# Patient Record
Sex: FEMALE | Race: BLACK OR AFRICAN AMERICAN | NOT HISPANIC OR LATINO | Employment: OTHER | URBAN - METROPOLITAN AREA
[De-identification: names, ages, dates, MRNs, and addresses within clinical notes are randomized per-mention and may not be internally consistent; named-entity substitution may affect disease eponyms.]

---

## 2022-08-23 ENCOUNTER — OFFICE VISIT (OUTPATIENT)
Dept: FAMILY MEDICINE | Facility: CLINIC | Age: 57
End: 2022-08-23
Payer: COMMERCIAL

## 2022-08-23 ENCOUNTER — TELEPHONE (OUTPATIENT)
Dept: FAMILY MEDICINE | Facility: CLINIC | Age: 57
End: 2022-08-23

## 2022-08-23 VITALS
DIASTOLIC BLOOD PRESSURE: 82 MMHG | OXYGEN SATURATION: 98 % | HEIGHT: 62 IN | SYSTOLIC BLOOD PRESSURE: 130 MMHG | HEART RATE: 82 BPM | TEMPERATURE: 98 F | BODY MASS INDEX: 23.19 KG/M2 | WEIGHT: 126 LBS

## 2022-08-23 DIAGNOSIS — Z01.419 WELL WOMAN EXAM: ICD-10-CM

## 2022-08-23 DIAGNOSIS — D64.9 LOW HEMOGLOBIN: Primary | ICD-10-CM

## 2022-08-23 DIAGNOSIS — I10 BENIGN ESSENTIAL HTN: ICD-10-CM

## 2022-08-23 DIAGNOSIS — F41.8 MIXED ANXIETY DEPRESSIVE DISORDER: Primary | ICD-10-CM

## 2022-08-23 DIAGNOSIS — Z12.11 SCREENING FOR COLON CANCER: ICD-10-CM

## 2022-08-23 DIAGNOSIS — Z12.31 SCREENING MAMMOGRAM FOR BREAST CANCER: ICD-10-CM

## 2022-08-23 DIAGNOSIS — E78.00 PURE HYPERCHOLESTEROLEMIA, UNSPECIFIED: ICD-10-CM

## 2022-08-23 PROCEDURE — 3075F SYST BP GE 130 - 139MM HG: CPT | Mod: CPTII,S$GLB,, | Performed by: FAMILY MEDICINE

## 2022-08-23 PROCEDURE — 3079F DIAST BP 80-89 MM HG: CPT | Mod: CPTII,S$GLB,, | Performed by: FAMILY MEDICINE

## 2022-08-23 PROCEDURE — 3079F PR MOST RECENT DIASTOLIC BLOOD PRESSURE 80-89 MM HG: ICD-10-PCS | Mod: CPTII,S$GLB,, | Performed by: FAMILY MEDICINE

## 2022-08-23 PROCEDURE — 99204 OFFICE O/P NEW MOD 45 MIN: CPT | Mod: S$GLB,,, | Performed by: FAMILY MEDICINE

## 2022-08-23 PROCEDURE — 99204 PR OFFICE/OUTPT VISIT, NEW, LEVL IV, 45-59 MIN: ICD-10-PCS | Mod: S$GLB,,, | Performed by: FAMILY MEDICINE

## 2022-08-23 PROCEDURE — 3008F PR BODY MASS INDEX (BMI) DOCUMENTED: ICD-10-PCS | Mod: CPTII,S$GLB,, | Performed by: FAMILY MEDICINE

## 2022-08-23 PROCEDURE — 86803 HEPATITIS C AB TEST: CPT | Performed by: FAMILY MEDICINE

## 2022-08-23 PROCEDURE — 87389 HIV-1 AG W/HIV-1&-2 AB AG IA: CPT | Performed by: FAMILY MEDICINE

## 2022-08-23 PROCEDURE — 3008F BODY MASS INDEX DOCD: CPT | Mod: CPTII,S$GLB,, | Performed by: FAMILY MEDICINE

## 2022-08-23 PROCEDURE — 3075F PR MOST RECENT SYSTOLIC BLOOD PRESS GE 130-139MM HG: ICD-10-PCS | Mod: CPTII,S$GLB,, | Performed by: FAMILY MEDICINE

## 2022-08-23 RX ORDER — ATENOLOL 50 MG/1
TABLET ORAL
COMMUNITY
End: 2022-08-31

## 2022-08-23 NOTE — PROGRESS NOTES
"  Family Medicine Note  [facility]    Chief Complaint   Patient presents with    Establish Care        HPI:  56 female new patient here to establish care.  Chart review shows history of treatment for anxiety, HTN, HLD.    Blood Pressure at goal on medication,   Hyperlipidemia stable on appropriate intensity statin therapy  Anxiety stable on medication - has OCD component    However despite above, patient has been rationing/not taking medication for quite some time, as she has been without care.  Mood good off of sertraline.  Pulse somewhat elevated in absence of atenolol    Due for mammogram  Needs GYN referral  Needs GI referral for Cscope    ROS: no acute issues today    Vitals:    08/23/22 0822 08/23/22 0838   BP: 130/82    BP Location: Right arm    Patient Position: Sitting    Pulse: (!) 116 82   Temp: 97.7 °F (36.5 °C)    SpO2: 98%    Weight: 57.2 kg (126 lb)    Height: 5' 2" (1.575 m)       Body mass index is 23.05 kg/m².      General:  AOx3, well nourished and developed in no acute distress  Eyes:  PERRLA, EOMI, vision intact grossly  ENT:  normal hearing, moist oral mucosa  Neck:  trachea midline with no masses or thyromegaly  Heart:  RRR, no murmurs.  No edema noted, extremities warm and well perfused  Lungs:  clear to auscultation bilaterally with symmetric chest movement  Abdomen:  Soft, nontender, nondistended.  Normal bowel sounds  Musculoskeletal:  Normal gait.  Normal posture.  Normal muscular development with no joint swelling.  Neurological:  CN II-XII grossly intact. Symmetric strength and sensation  Psych:  Normal mood and affect.  Able to demonstrate good judgement and personal insight.      Assessment/Plan:    Mixed anxiety depressive disorder    Benign essential HTN    Pure hypercholesterolemia, unspecified    Screening mammogram for breast cancer    Screening for colon cancer    Well woman exam       1.  Seems stable off of medication, check thyroid, can resume " sertraline at patient's request  2.  At goal off of medication, get bloodwork to check organ function  3.  Check lipid profile off of statin to assess cardiac risk  4.  Get mammogram  5.  Get Cscope  6.  Send to Dr. Styles for well woman

## 2022-08-23 NOTE — TELEPHONE ENCOUNTER
Spoke with patient. Patient aware that Hemoglobin critically 5.2 and will need to redraw labs to confirm. Patient scheduled to return on 08/24/22 at 9:30 am for lab draw.

## 2022-08-23 NOTE — PATIENT INSTRUCTIONS
Get bloodwork today  Will send to Dr. Styles for pap smear/woman exam  Will send to Dr. Brooke for colonoscopy  Get mammogram    Hold off on any medication right now until we look at your bloodwork    If at any time, you feel you need to be back on medication, let me know.

## 2022-08-24 ENCOUNTER — LAB VISIT (OUTPATIENT)
Dept: LAB | Facility: CLINIC | Age: 57
End: 2022-08-24
Payer: COMMERCIAL

## 2022-08-24 ENCOUNTER — TELEPHONE (OUTPATIENT)
Dept: FAMILY MEDICINE | Facility: CLINIC | Age: 57
End: 2022-08-24
Payer: COMMERCIAL

## 2022-08-24 DIAGNOSIS — D64.9 LOW HEMOGLOBIN: ICD-10-CM

## 2022-08-24 PROBLEM — D50.9 MICROCYTIC ANEMIA: Status: ACTIVE | Noted: 2022-08-24

## 2022-08-24 LAB
ANISOCYTOSIS BLD QL SMEAR: SLIGHT
BASOPHILS # BLD AUTO: ABNORMAL K/UL (ref 0–0.2)
BASOPHILS NFR BLD: 3 % (ref 0–1.9)
DIFFERENTIAL METHOD: ABNORMAL
EOSINOPHIL # BLD AUTO: ABNORMAL K/UL (ref 0–0.5)
EOSINOPHIL NFR BLD: 0 % (ref 0–8)
ERYTHROCYTE [DISTWIDTH] IN BLOOD BY AUTOMATED COUNT: 19.8 % (ref 11.5–14.5)
HCT VFR BLD AUTO: 22.3 % (ref 37–48.5)
HGB BLD-MCNC: 5.5 G/DL (ref 12–16)
IMM GRANULOCYTES # BLD AUTO: ABNORMAL K/UL (ref 0–0.04)
IMM GRANULOCYTES NFR BLD AUTO: ABNORMAL % (ref 0–0.5)
LYMPHOCYTES # BLD AUTO: ABNORMAL K/UL (ref 1–4.8)
LYMPHOCYTES NFR BLD: 52 % (ref 18–48)
MCH RBC QN AUTO: 15.3 PG (ref 27–31)
MCHC RBC AUTO-ENTMCNC: 24.7 G/DL (ref 32–36)
MCV RBC AUTO: 62 FL (ref 82–98)
MONOCYTES # BLD AUTO: ABNORMAL K/UL (ref 0.3–1)
MONOCYTES NFR BLD: 3 % (ref 4–15)
NEUTROPHILS NFR BLD: 42 % (ref 38–73)
NRBC BLD-RTO: 0 /100 WBC
PLATELET # BLD AUTO: 507 K/UL (ref 150–450)
PMV BLD AUTO: 9.8 FL (ref 9.2–12.9)
POIKILOCYTOSIS BLD QL SMEAR: SLIGHT
POLYCHROMASIA BLD QL SMEAR: ABNORMAL
RBC # BLD AUTO: 3.6 M/UL (ref 4–5.4)
TARGETS BLD QL SMEAR: ABNORMAL
WBC # BLD AUTO: 4.92 K/UL (ref 3.9–12.7)

## 2022-08-24 PROCEDURE — 36415 COLL VENOUS BLD VENIPUNCTURE: CPT | Mod: ,,, | Performed by: STUDENT IN AN ORGANIZED HEALTH CARE EDUCATION/TRAINING PROGRAM

## 2022-08-24 PROCEDURE — 85025 COMPLETE CBC W/AUTO DIFF WBC: CPT | Performed by: FAMILY MEDICINE

## 2022-08-24 PROCEDURE — 36415 PR COLLECTION VENOUS BLOOD,VENIPUNCTURE: ICD-10-PCS | Mod: ,,, | Performed by: STUDENT IN AN ORGANIZED HEALTH CARE EDUCATION/TRAINING PROGRAM

## 2022-08-24 NOTE — TELEPHONE ENCOUNTER
Spoke with emergency contact Drea Harvey. Aware that the patient has critically low hemoglobin and may need to go to the ER for blood transfusion. Verbalized understanding and will relay message to the patient.

## 2022-08-24 NOTE — TELEPHONE ENCOUNTER
Spoke with patient. Patient aware that hemoglobin is 5.5 and that she may need to go to the ER for blood transfusion. Patient verbalized understanding.

## 2022-08-24 NOTE — TELEPHONE ENCOUNTER
Received a call from Jaci Guillermo/[facility] with a critical level hemoglobin. Level reading 5.5. Call Dr. Nixon states received that information and issued orders to the nurses at [facility] for patient notification.

## 2022-08-24 NOTE — TELEPHONE ENCOUNTER
----- Message from Anthony Nixon MD sent at 8/24/2022  2:03 PM CDT -----  Please contact the patient and let them know that their results show EXTREMELY low blood counts.  She may need to go the ED for a blood transfusion

## 2022-08-25 ENCOUNTER — OUTSIDE PLACE OF SERVICE (OUTPATIENT)
Dept: OBSTETRICS AND GYNECOLOGY | Facility: CLINIC | Age: 57
End: 2022-08-25
Payer: COMMERCIAL

## 2022-08-25 PROCEDURE — 99225 PR SUBSEQUENT OBSERVATION CARE,LEVEL II: ICD-10-PCS | Mod: ,,, | Performed by: OBSTETRICS & GYNECOLOGY

## 2022-08-25 PROCEDURE — 99225 PR SUBSEQUENT OBSERVATION CARE,LEVEL II: CPT | Mod: ,,, | Performed by: OBSTETRICS & GYNECOLOGY

## 2022-08-29 ENCOUNTER — HOSPITAL ENCOUNTER (OUTPATIENT)
Dept: RADIOLOGY | Facility: CLINIC | Age: 57
Discharge: HOME | End: 2022-08-29
Attending: FAMILY MEDICINE
Payer: COMMERCIAL

## 2022-08-29 ENCOUNTER — TELEPHONE (OUTPATIENT)
Dept: FAMILY MEDICINE | Facility: CLINIC | Age: 57
End: 2022-08-29
Payer: COMMERCIAL

## 2022-08-29 ENCOUNTER — OFFICE VISIT (OUTPATIENT)
Dept: OBSTETRICS AND GYNECOLOGY | Facility: CLINIC | Age: 57
End: 2022-08-29
Payer: COMMERCIAL

## 2022-08-29 VITALS
DIASTOLIC BLOOD PRESSURE: 82 MMHG | BODY MASS INDEX: 22.89 KG/M2 | HEIGHT: 62 IN | WEIGHT: 124.38 LBS | SYSTOLIC BLOOD PRESSURE: 154 MMHG

## 2022-08-29 DIAGNOSIS — Z12.31 SCREENING MAMMOGRAM FOR BREAST CANCER: ICD-10-CM

## 2022-08-29 DIAGNOSIS — Z01.419 ENCOUNTER FOR ANNUAL ROUTINE GYNECOLOGICAL EXAMINATION: Primary | ICD-10-CM

## 2022-08-29 DIAGNOSIS — Z12.4 SCREENING FOR CERVICAL CANCER: ICD-10-CM

## 2022-08-29 DIAGNOSIS — I10 BENIGN ESSENTIAL HTN: ICD-10-CM

## 2022-08-29 DIAGNOSIS — D25.9 UTERINE LEIOMYOMA, UNSPECIFIED LOCATION: ICD-10-CM

## 2022-08-29 PROBLEM — D50.0 IRON DEFICIENCY ANEMIA DUE TO CHRONIC BLOOD LOSS: Status: ACTIVE | Noted: 2022-08-25

## 2022-08-29 PROCEDURE — 1159F PR MEDICATION LIST DOCUMENTED IN MEDICAL RECORD: ICD-10-PCS | Mod: CPTII,S$GLB,,

## 2022-08-29 PROCEDURE — 77063 MAMMO DIGITAL SCREENING BILAT WITH TOMO: ICD-10-PCS | Mod: S$GLB,,, | Performed by: RADIOLOGY

## 2022-08-29 PROCEDURE — 99386 PR PREVENTIVE VISIT,NEW,40-64: ICD-10-PCS | Mod: S$GLB,,,

## 2022-08-29 PROCEDURE — 3079F PR MOST RECENT DIASTOLIC BLOOD PRESSURE 80-89 MM HG: ICD-10-PCS | Mod: CPTII,S$GLB,,

## 2022-08-29 PROCEDURE — 3077F PR MOST RECENT SYSTOLIC BLOOD PRESSURE >= 140 MM HG: ICD-10-PCS | Mod: CPTII,S$GLB,,

## 2022-08-29 PROCEDURE — 3008F BODY MASS INDEX DOCD: CPT | Mod: CPTII,S$GLB,,

## 2022-08-29 PROCEDURE — 77067 SCR MAMMO BI INCL CAD: CPT | Mod: S$GLB,,, | Performed by: RADIOLOGY

## 2022-08-29 PROCEDURE — 3079F DIAST BP 80-89 MM HG: CPT | Mod: CPTII,S$GLB,,

## 2022-08-29 PROCEDURE — 1159F MED LIST DOCD IN RCRD: CPT | Mod: CPTII,S$GLB,,

## 2022-08-29 PROCEDURE — 3077F SYST BP >= 140 MM HG: CPT | Mod: CPTII,S$GLB,,

## 2022-08-29 PROCEDURE — 1160F RVW MEDS BY RX/DR IN RCRD: CPT | Mod: CPTII,S$GLB,,

## 2022-08-29 PROCEDURE — 88175 CYTOPATH C/V AUTO FLUID REDO: CPT

## 2022-08-29 PROCEDURE — 1160F PR REVIEW ALL MEDS BY PRESCRIBER/CLIN PHARMACIST DOCUMENTED: ICD-10-PCS | Mod: CPTII,S$GLB,,

## 2022-08-29 PROCEDURE — 3008F PR BODY MASS INDEX (BMI) DOCUMENTED: ICD-10-PCS | Mod: CPTII,S$GLB,,

## 2022-08-29 PROCEDURE — 99386 PREV VISIT NEW AGE 40-64: CPT | Mod: S$GLB,,,

## 2022-08-29 PROCEDURE — 77063 BREAST TOMOSYNTHESIS BI: CPT | Mod: S$GLB,,, | Performed by: RADIOLOGY

## 2022-08-29 PROCEDURE — 77067 MAMMO DIGITAL SCREENING BILAT WITH TOMO: ICD-10-PCS | Mod: S$GLB,,, | Performed by: RADIOLOGY

## 2022-08-29 RX ORDER — CYANOCOBALAMIN 1000 UG/ML
1000 INJECTION, SOLUTION INTRAMUSCULAR; SUBCUTANEOUS
COMMUNITY
Start: 2022-08-31 | End: 2022-09-21

## 2022-08-29 RX ORDER — POLYETHYLENE GLYCOL 3350 17 G/17G
17 POWDER, FOR SOLUTION ORAL
COMMUNITY
Start: 2022-08-25

## 2022-08-29 RX ORDER — FERROUS SULFATE 325(65) MG
325 TABLET, DELAYED RELEASE (ENTERIC COATED) ORAL
COMMUNITY
Start: 2022-08-25 | End: 2022-08-31 | Stop reason: SDUPTHER

## 2022-08-29 NOTE — TELEPHONE ENCOUNTER
----- Message from Violet Agnulo MA sent at 8/29/2022  1:28 PM CDT -----  Regarding: iron shots  Contact: PT  Do we have the medication here to do that? I know we cannot administer outside medication, Please advise.   ----- Message -----  From: Junie Kim  Sent: 8/29/2022  11:41 AM CDT  To: [facility] THORNE Staff    Type: Needs Medical Advice    Who Called: PT  Best Call Back Number: [phone]  Additional  Information: Pt asking for a call back to schedule an appt to get the shot for being Anemic. Would like to be seen Wed 8/31/22. Was told by [facility] told her she needs to start getting the shot once a week   Please Advise- Thank you

## 2022-08-29 NOTE — TELEPHONE ENCOUNTER
----- Message from Junie Julio sent at 8/29/2022 11:30 AM CDT -----  Contact: PT  Type: Needs Medical Advice    Who Called: PT  Best Call Back Number: [phone]  Additional  Information: Pt asking for a call back to schedule an appt to get the shot for being Anemic. Would like to be seen Wed 8/31/22. Was told by [facility] told her she needs to start getting the shot once a week   Please Advise- Thank you

## 2022-08-29 NOTE — PROGRESS NOTES
"SUBJECTIVE:       Chief Complaint: Annual Exam (Referred from Dr. Nixon)    History of Present Illness: Sally Gordon is a 56 y.o. female,  who presents for annual well-woman exam by referral from PCP for excessive bleeding r/t uterine fibroids. She was seen at [facility] where TV U/S showed thickened endometrium and multiple uterine fibroids. She was given 2 units PRBCs for symptomatic anemia with a Hgb of 5.2.  Her last Pap was "years ago".  She does not have a history of abnormal Paps.  Patient does not have a history of STD/STI.  She does not desire STD/STI testing today.  She has been referred for colonoscopy.  She reports she has irregular periods and denies heavy bleeding and severe menstrual cramps.    Review of Systems   Constitutional:  Negative for activity change, appetite change, chills, diaphoresis, fatigue, fever and unexpected weight change.   HENT:  Negative for nasal congestion, dental problem, ear pain, postnasal drip, rhinorrhea, sinus pressure/congestion, sneezing and sore throat.    Eyes:  Negative for discharge, visual disturbance and eye dryness.   Respiratory:  Negative for cough, chest tightness and shortness of breath.    Cardiovascular:  Negative for chest pain, palpitations and leg swelling.   Gastrointestinal:  Negative for abdominal distention, abdominal pain, change in bowel habit, constipation, diarrhea, nausea, vomiting, reflux and change in bowel habit.   Endocrine: Negative for cold intolerance, heat intolerance, polydipsia and polyuria.   Genitourinary:  Positive for menstrual irregularity. Negative for difficulty urinating, dyspareunia, dysuria, frequency, genital sores, hot flashes, menstrual problem, pelvic pain, urgency, vaginal bleeding, vaginal discharge, vaginal pain and vaginal dryness.   Musculoskeletal:  Negative for back pain, myalgias, neck pain and neck stiffness.   Integumentary:  Negative for rash, breast mass, breast discharge and breast tenderness. " "  Allergic/Immunologic: Negative for environmental allergies and immunocompromised state.   Neurological:  Negative for dizziness, syncope, facial asymmetry, speech difficulty, weakness, light-headedness, numbness and headaches.   Hematological:  Negative for adenopathy. Does not bruise/bleed easily.   Psychiatric/Behavioral:  Negative for confusion, decreased concentration and sleep disturbance. The patient is not nervous/anxious.    Breast: Negative for mass and tenderness      OBJECTIVE:      BP (!) 154/82 (Patient Position: Sitting)   Ht 5' 2" (1.575 m)   Wt 56.4 kg (124 lb 6.4 oz)   BMI 22.75 kg/m²     Chaperone present.    Physical Exam:   Constitutional: She is oriented to person, place, and time. Vital signs are normal. She appears well-developed and well-nourished. She is cooperative. No distress.    HENT:   Head: Normocephalic and atraumatic.   Right Ear: External ear normal.   Left Ear: External ear normal.   Nose: Nose normal.   Mouth/Throat: Uvula is midline, oropharynx is clear and moist and mucous membranes are normal.    Eyes: Pupils are equal, round, and reactive to light. Conjunctivae and lids are normal.    Neck: Trachea normal. Carotid bruit is not present. No thyroid mass and no thyromegaly present.    Cardiovascular:  Normal rate, intact distal pulses and normal pulses.      Exam reveals no clubbing.      Pulse Score: 2+   Pulmonary/Chest: Effort normal and breath sounds normal. She exhibits no deformity. Right breast exhibits no inverted nipple, no mass, no nipple discharge, no skin change, no tenderness, no bleeding and no swelling. Left breast exhibits no inverted nipple, no mass, no nipple discharge, no skin change, no tenderness, no bleeding and no swelling.        Abdominal: Soft. Bowel sounds are normal. She exhibits no distension. There is no splenomegaly or hepatomegaly. There is no abdominal tenderness. No hernia.     Genitourinary:    Inguinal canal, vagina, uterus, right adnexa " and left adnexa normal.   Rectum:      No external hemorrhoid.      Pelvic exam was performed with patient supine.   The external female genitalia was normal.   Genitalia hair distrobution normal .   Labial bartholins normal.Cervix is normal. Right adnexum displays no tenderness. Left adnexum displays no tenderness. No  no vaginal discharge, tenderness or bleeding in the vagina. Vagina was moist.Vaginal atrophy noted. Cervix exhibits no motion tenderness, no lesion, no discharge and no lesion.    pap smear completedUterus is not tender and no uterine prolapse. Normal urethral meatus.Urethra findings: no urethral mass and no tendernessBladder findings: no bladder distention and no bladder tenderness          Musculoskeletal: Normal range of motion and moves all extremeties.      Right lower leg: No edema.      Left lower leg: No edema.      Lymphadenopathy:        Head (right side): No submandibular adenopathy present.        Head (left side): No submandibular adenopathy present.        Right cervical: No superficial cervical and no deep cervical adenopathy present.       Left cervical: No superficial cervical and no deep cervical adenopathy present.        Right: No supraclavicular adenopathy present.        Left: No supraclavicular adenopathy present.    Neurological: She is alert and oriented to person, place, and time. She has normal strength. GCS eye subscore is 4. GCS verbal subscore is 5. GCS motor subscore is 6.    Skin: Skin is warm and dry. Capillary refill takes less than 2 seconds. Nails show no clubbing.    Psychiatric: She has a normal mood and affect. Her speech is normal and behavior is normal. Mood, affect, judgment and thought content normal.       ASSESSMENT:       1. Encounter for annual routine gynecological examination    2. Screening for cervical cancer    3. Uterine leiomyoma, unspecified location    4. Benign essential HTN          PLAN:     Encounter for annual routine gynecological  examination    Screening for cervical cancer  -     Liquid-Based Pap Smear, Screening    Uterine leiomyoma, unspecified location  -     Ambulatory referral/consult to Gynecology    Benign essential HTN    Recommended consult with Dr. Huff for uterine fibroids and thickened endometrium. Discussed possible EMB. Recommended patient follow-up with PCP this week for elevated BP as uncontrolled HTN could limit medical and surgical treatment options. Patient agreeable to treatment plan. Patient was counseled today on [employer] Pap guidelines and recommendations for yearly pelvic exams, mammograms and monthly self breast exams; to see her PCP for other health maintenance.    Follow up in about 4 weeks (around 9/26/2022) for EMB and surgical consult with Dr. Huff.

## 2022-08-30 ENCOUNTER — TELEPHONE (OUTPATIENT)
Dept: FAMILY MEDICINE | Facility: CLINIC | Age: 57
End: 2022-08-30
Payer: COMMERCIAL

## 2022-08-30 NOTE — TELEPHONE ENCOUNTER
Call placed to patient due to message left regarding clinic administering Iron injections. Informed patient that this injection is not given a the clinic. Instructed patient to ask Dr. Nixon on scheduled appt tomorrow for direction for iron injections.    ----- Message from Violet Angulo MA sent at 8/29/2022  1:28 PM CDT -----  Regarding: iron shots  Contact: PT  Do we have the medication here to do that? I know we cannot administer outside medication, Please advise.   ----- Message -----  From: Junie Kim  Sent: 8/29/2022  11:41 AM CDT  To: [facility] THORNE Staff    Type: Needs Medical Advice    Who Called: PT  Best Call Back Number: [phone]  Additional  Information: Pt asking for a call back to schedule an appt to get the shot for being Anemic. Would like to be seen Wed 8/31/22. Was told by [facility] told her she needs to start getting the shot once a week   Please Advise- Thank you

## 2022-08-31 ENCOUNTER — OFFICE VISIT (OUTPATIENT)
Dept: FAMILY MEDICINE | Facility: CLINIC | Age: 57
End: 2022-08-31
Payer: COMMERCIAL

## 2022-08-31 VITALS
HEART RATE: 74 BPM | HEIGHT: 62 IN | DIASTOLIC BLOOD PRESSURE: 78 MMHG | TEMPERATURE: 99 F | WEIGHT: 126 LBS | SYSTOLIC BLOOD PRESSURE: 134 MMHG | OXYGEN SATURATION: 96 % | BODY MASS INDEX: 23.19 KG/M2

## 2022-08-31 DIAGNOSIS — E78.00 PURE HYPERCHOLESTEROLEMIA, UNSPECIFIED: ICD-10-CM

## 2022-08-31 DIAGNOSIS — D25.9 UTERINE LEIOMYOMA, UNSPECIFIED LOCATION: Primary | ICD-10-CM

## 2022-08-31 DIAGNOSIS — D50.0 IRON DEFICIENCY ANEMIA DUE TO CHRONIC BLOOD LOSS: ICD-10-CM

## 2022-08-31 DIAGNOSIS — D50.9 MICROCYTIC ANEMIA: ICD-10-CM

## 2022-08-31 DIAGNOSIS — F41.8 MIXED ANXIETY DEPRESSIVE DISORDER: ICD-10-CM

## 2022-08-31 PROCEDURE — 3078F PR MOST RECENT DIASTOLIC BLOOD PRESSURE < 80 MM HG: ICD-10-PCS | Mod: CPTII,S$GLB,, | Performed by: FAMILY MEDICINE

## 2022-08-31 PROCEDURE — 96372 PR INJECTION,THERAP/PROPH/DIAG2ST, IM OR SUBCUT: ICD-10-PCS | Mod: S$GLB,,, | Performed by: FAMILY MEDICINE

## 2022-08-31 PROCEDURE — 3075F PR MOST RECENT SYSTOLIC BLOOD PRESS GE 130-139MM HG: ICD-10-PCS | Mod: CPTII,S$GLB,, | Performed by: FAMILY MEDICINE

## 2022-08-31 PROCEDURE — 3008F BODY MASS INDEX DOCD: CPT | Mod: CPTII,S$GLB,, | Performed by: FAMILY MEDICINE

## 2022-08-31 PROCEDURE — 99214 PR OFFICE/OUTPT VISIT, EST, LEVL IV, 30-39 MIN: ICD-10-PCS | Mod: 25,S$GLB,, | Performed by: FAMILY MEDICINE

## 2022-08-31 PROCEDURE — 1159F PR MEDICATION LIST DOCUMENTED IN MEDICAL RECORD: ICD-10-PCS | Mod: CPTII,S$GLB,, | Performed by: FAMILY MEDICINE

## 2022-08-31 PROCEDURE — 3075F SYST BP GE 130 - 139MM HG: CPT | Mod: CPTII,S$GLB,, | Performed by: FAMILY MEDICINE

## 2022-08-31 PROCEDURE — 3078F DIAST BP <80 MM HG: CPT | Mod: CPTII,S$GLB,, | Performed by: FAMILY MEDICINE

## 2022-08-31 PROCEDURE — 96372 THER/PROPH/DIAG INJ SC/IM: CPT | Mod: S$GLB,,, | Performed by: FAMILY MEDICINE

## 2022-08-31 PROCEDURE — 3008F PR BODY MASS INDEX (BMI) DOCUMENTED: ICD-10-PCS | Mod: CPTII,S$GLB,, | Performed by: FAMILY MEDICINE

## 2022-08-31 PROCEDURE — 99214 OFFICE O/P EST MOD 30 MIN: CPT | Mod: 25,S$GLB,, | Performed by: FAMILY MEDICINE

## 2022-08-31 PROCEDURE — 1159F MED LIST DOCD IN RCRD: CPT | Mod: CPTII,S$GLB,, | Performed by: FAMILY MEDICINE

## 2022-08-31 RX ORDER — ATORVASTATIN CALCIUM 10 MG/1
10 TABLET, FILM COATED ORAL DAILY
Qty: 90 TABLET | Refills: 3
Start: 2022-08-31 | End: 2022-08-31 | Stop reason: SDUPTHER

## 2022-08-31 RX ORDER — FERROUS SULFATE 325(65) MG
325 TABLET, DELAYED RELEASE (ENTERIC COATED) ORAL EVERY OTHER DAY
Qty: 15 TABLET | Refills: 11
Start: 2022-08-31 | End: 2023-08-31

## 2022-08-31 RX ORDER — CYANOCOBALAMIN 1000 UG/ML
1000 INJECTION, SOLUTION INTRAMUSCULAR; SUBCUTANEOUS
Status: COMPLETED | OUTPATIENT
Start: 2022-08-31 | End: 2022-08-31

## 2022-08-31 RX ORDER — SERTRALINE HYDROCHLORIDE 100 MG/1
100 TABLET, FILM COATED ORAL DAILY
Qty: 90 TABLET | Refills: 3 | Status: SHIPPED | OUTPATIENT
Start: 2022-08-31 | End: 2023-08-28

## 2022-08-31 RX ORDER — ATORVASTATIN CALCIUM 10 MG/1
10 TABLET, FILM COATED ORAL DAILY
Qty: 90 TABLET | Refills: 3 | Status: SHIPPED | OUTPATIENT
Start: 2022-08-31 | End: 2023-08-28

## 2022-08-31 RX ORDER — SERTRALINE HYDROCHLORIDE 100 MG/1
100 TABLET, FILM COATED ORAL DAILY
Qty: 30 TABLET | Refills: 11
Start: 2022-08-31 | End: 2022-08-31 | Stop reason: SDUPTHER

## 2022-08-31 RX ADMIN — CYANOCOBALAMIN 1000 MCG: 1000 INJECTION, SOLUTION INTRAMUSCULAR; SUBCUTANEOUS at 08:08

## 2022-08-31 NOTE — PATIENT INSTRUCTIONS
Doctors:  Dr Nixon - primary doctor - doing well today  Dr. Shah - female doctor  Dr. Brooke - doctor for colonoscopy - see him on October 17    Medicine:  Take iron pill every other day - this helps body make more blood  Take B12 shot today - you don't need this routinely  Take cholesterol medicine daily - lipitor  Take anxiety medication daily - zoloft  Powder - only as needed for constipation    Follow up 3 months

## 2022-08-31 NOTE — PROGRESS NOTES
"  Family Medicine Note  [facility]    Chief Complaint   Patient presents with    Follow-up        HPI:  56 female here for follow up from GYN - BP was elevated there, but at goal here today.  Now up to date on mammogram and pap    Recent bloodwork showed profound anemia - patient was instructed to go to ED and received 2 units PRBCs.  TVUS showed uterine fibroids and thick endometrium - apparently heavy uterine bleeding was problem for quite some time.    GI referral pending.    Is on iron supplement now.  Needs B12 shot today.  Hyperlipidemia stable on appropriate intensity statin therapy  Anxiety stable on medication      ROS: energy level improving    Vitals:    08/31/22 0813   BP: 134/78   BP Location: Left arm   Patient Position: Sitting   BP Method: Medium (Manual)   Pulse: 74   Temp: 98.6 °F (37 °C)   TempSrc: Temporal   SpO2: 96%   Weight: 57.2 kg (126 lb)   Height: 5' 2" (1.575 m)      Body mass index is 23.05 kg/m².      General:  AOx3, well nourished and developed in no acute distress  Eyes:  PERRLA, EOMI, vision intact grossly  ENT:  normal hearing, moist oral mucosa  Neck:  trachea midline with no masses or thyromegaly  Heart:  RRR, no murmurs.  No edema noted, extremities warm and well perfused  Lungs:  clear to auscultation bilaterally with symmetric chest movement  Abdomen:  Soft, nontender, nondistended.  Normal bowel sounds  Musculoskeletal:  Normal gait.  Normal posture.  Normal muscular development with no joint swelling.  Neurological:  CN II-XII grossly intact. Symmetric strength and sensation  Psych:  Normal mood and affect.  Able to demonstrate good judgement and personal insight.      Assessment/Plan:    Uterine leiomyoma, unspecified location    Microcytic anemia    Iron deficiency anemia due to chronic blood loss    Pure hypercholesterolemia, unspecified    Mixed anxiety depressive disorder    Other orders  -     ferrous sulfate 325 (65 FE) MG EC tablet; Take 1 " tablet (325 mg total) by mouth every other day.  Dispense: 15 tablet; Refill: 11  -     Discontinue: atorvastatin (LIPITOR) 10 MG tablet; Take 1 tablet (10 mg total) by mouth once daily.  Dispense: 90 tablet; Refill: 3  -     Discontinue: sertraline (ZOLOFT) 100 MG tablet; Take 1 tablet (100 mg total) by mouth once daily.  Dispense: 30 tablet; Refill: 11  -     atorvastatin (LIPITOR) 10 MG tablet; Take 1 tablet (10 mg total) by mouth once daily.  Dispense: 90 tablet; Refill: 3  -     sertraline (ZOLOFT) 100 MG tablet; Take 1 tablet (100 mg total) by mouth once daily.  Dispense: 90 tablet; Refill: 3        Stable with GYN follow up now  Stable, GI pending.  Starting iron supplementation today  Stable on statin  Stable on zoloft

## 2022-09-06 LAB
FINAL PATHOLOGIC DIAGNOSIS: NORMAL
Lab: NORMAL

## 2022-09-21 ENCOUNTER — TELEPHONE (OUTPATIENT)
Dept: FAMILY MEDICINE | Facility: CLINIC | Age: 57
End: 2022-09-21
Payer: COMMERCIAL

## 2022-09-21 NOTE — TELEPHONE ENCOUNTER
Call placed to patient due to message left. Patient not available at this time message left on machine for return call.    ----- Message from Emily Baker sent at 9/21/2022  2:28 PM CDT -----  Contact: pt  Type: Needs Medical Advice         Who Called: pt  Best Call Back Number:[phone]  Additional Information: Requesting a call back regarding pt is wanting to know if she sould keep her appt with Dr joiner on 09/29 or just go to Dr Garcia on 10/07.  Pt is asking for office to call her back.   Please Advise- Thank you

## 2022-09-28 ENCOUNTER — TELEPHONE (OUTPATIENT)
Dept: OBSTETRICS AND GYNECOLOGY | Facility: CLINIC | Age: 57
End: 2022-09-28
Payer: COMMERCIAL

## 2022-09-28 ENCOUNTER — TELEPHONE (OUTPATIENT)
Dept: OBSTETRICS AND GYNECOLOGY | Facility: CLINIC | Age: 57
End: 2022-09-28

## 2022-09-28 NOTE — TELEPHONE ENCOUNTER
----- Message from Junie Kim sent at 9/28/2022 12:06 PM CDT -----  Contact: PT  Type:  Patient Returning Call    Who Called:  PT  Who Left Message for Patient:  don't know  Does the patient know what this is regarding?:  No  Best Call Back Number:  [phone]  Additional Information: Pt had a missed a call no vmail left

## 2022-09-28 NOTE — TELEPHONE ENCOUNTER
Spoke with pt via telephone regarding pap results from 8/29/22 done by A Candy RIOS.  Pt has appt tomorrow for EMB and surgical consult and will address urinary incontinence.  Pt v/u

## 2022-09-29 ENCOUNTER — PROCEDURE VISIT (OUTPATIENT)
Dept: OBSTETRICS AND GYNECOLOGY | Facility: CLINIC | Age: 57
End: 2022-09-29
Payer: COMMERCIAL

## 2022-09-29 VITALS
DIASTOLIC BLOOD PRESSURE: 82 MMHG | BODY MASS INDEX: 23 KG/M2 | WEIGHT: 125 LBS | HEIGHT: 62 IN | SYSTOLIC BLOOD PRESSURE: 124 MMHG

## 2022-09-29 DIAGNOSIS — N93.9 ABNORMAL UTERINE BLEEDING: Primary | ICD-10-CM

## 2022-09-29 DIAGNOSIS — D25.9 UTERINE LEIOMYOMA, UNSPECIFIED LOCATION: ICD-10-CM

## 2022-09-29 PROCEDURE — 99499 UNLISTED E&M SERVICE: CPT | Mod: S$GLB,,, | Performed by: OBSTETRICS & GYNECOLOGY

## 2022-09-29 PROCEDURE — 99499 NO LOS: ICD-10-PCS | Mod: S$GLB,,, | Performed by: OBSTETRICS & GYNECOLOGY

## 2022-10-06 ENCOUNTER — TELEPHONE (OUTPATIENT)
Dept: OBSTETRICS AND GYNECOLOGY | Facility: CLINIC | Age: 57
End: 2022-10-06
Payer: COMMERCIAL

## 2022-10-18 NOTE — PROCEDURES
Procedures  Patient with abnormal uterine bleeding.  Recommend endometrial biopsy.  Patient consented.  Risks/benefits were discussed.  Patient was placed in the supine position.  Sterile speculum was placed.  Cervix prepped with Betadine.  A single-tooth tenaculum was used on the anterior lip of the cervix.  Multiple attempts were made with the uterine pipelle and cervical os Finders/dilator to pass into the endometrium however unable to pass through the internal cervical os.  Possibly due to uterine fibroids.  Discussed need for evaluation endometrial cavity.  Recommend hysteroscopy with dilation curettage.  Will schedule have patient return for preoperative visit.  Plan discussed with patient she expressed understanding.

## 2022-10-21 ENCOUNTER — TELEPHONE (OUTPATIENT)
Dept: FAMILY MEDICINE | Facility: CLINIC | Age: 57
End: 2022-10-21
Payer: COMMERCIAL

## 2022-10-21 NOTE — TELEPHONE ENCOUNTER
----- Message from Anthony Nixon MD sent at 10/21/2022 12:49 PM CDT -----  Regarding: RE: Iron tablets  Contact: PT  Absolutely.  This is perfectly fine  ----- Message -----  From: Keyon Lenz LPN  Sent: 10/20/2022   4:20 PM CDT  To: Anthony Nixon MD  Subject: Iron tablets                                     MRN:  [id number]  See below. Is ok for patient to stop iron tablets x 7days for colonoscopy?  ----- Message -----  From: Junie Julio  Sent: 10/20/2022  11:52 AM CDT  To: [facility] THORNE Staff    Type: Needs Medical Advice    Who Called: PT  Best Call Back Number: [phone]  Additional  Information: Pt asking for a call back to find out if it's ok for her to stop asking her iron pills for 7 days. Having a Colonoscopy done on 10/31/22  Please Advise- Thank you

## 2022-10-26 ENCOUNTER — TELEPHONE (OUTPATIENT)
Dept: OBSTETRICS AND GYNECOLOGY | Facility: CLINIC | Age: 57
End: 2022-10-26

## 2022-10-31 LAB — CRC RECOMMENDATION EXT: NORMAL

## 2022-11-02 ENCOUNTER — OFFICE VISIT (OUTPATIENT)
Dept: OBSTETRICS AND GYNECOLOGY | Facility: CLINIC | Age: 57
End: 2022-11-02
Payer: COMMERCIAL

## 2022-11-02 VITALS
HEIGHT: 62 IN | BODY MASS INDEX: 23.74 KG/M2 | SYSTOLIC BLOOD PRESSURE: 128 MMHG | WEIGHT: 129 LBS | DIASTOLIC BLOOD PRESSURE: 76 MMHG

## 2022-11-02 DIAGNOSIS — N93.9 ABNORMAL UTERINE BLEEDING: Primary | ICD-10-CM

## 2022-11-02 DIAGNOSIS — D25.9 UTERINE LEIOMYOMA, UNSPECIFIED LOCATION: ICD-10-CM

## 2022-11-02 PROCEDURE — 99213 PR OFFICE/OUTPT VISIT, EST, LEVL III, 20-29 MIN: ICD-10-PCS | Mod: S$GLB,,, | Performed by: OBSTETRICS & GYNECOLOGY

## 2022-11-02 PROCEDURE — 1159F MED LIST DOCD IN RCRD: CPT | Mod: CPTII,S$GLB,, | Performed by: OBSTETRICS & GYNECOLOGY

## 2022-11-02 PROCEDURE — 3078F PR MOST RECENT DIASTOLIC BLOOD PRESSURE < 80 MM HG: ICD-10-PCS | Mod: CPTII,S$GLB,, | Performed by: OBSTETRICS & GYNECOLOGY

## 2022-11-02 PROCEDURE — 3008F BODY MASS INDEX DOCD: CPT | Mod: CPTII,S$GLB,, | Performed by: OBSTETRICS & GYNECOLOGY

## 2022-11-02 PROCEDURE — 99213 OFFICE O/P EST LOW 20 MIN: CPT | Mod: S$GLB,,, | Performed by: OBSTETRICS & GYNECOLOGY

## 2022-11-02 PROCEDURE — 3074F SYST BP LT 130 MM HG: CPT | Mod: CPTII,S$GLB,, | Performed by: OBSTETRICS & GYNECOLOGY

## 2022-11-02 PROCEDURE — 3078F DIAST BP <80 MM HG: CPT | Mod: CPTII,S$GLB,, | Performed by: OBSTETRICS & GYNECOLOGY

## 2022-11-02 PROCEDURE — 3074F PR MOST RECENT SYSTOLIC BLOOD PRESSURE < 130 MM HG: ICD-10-PCS | Mod: CPTII,S$GLB,, | Performed by: OBSTETRICS & GYNECOLOGY

## 2022-11-02 PROCEDURE — 3008F PR BODY MASS INDEX (BMI) DOCUMENTED: ICD-10-PCS | Mod: CPTII,S$GLB,, | Performed by: OBSTETRICS & GYNECOLOGY

## 2022-11-02 PROCEDURE — 1159F PR MEDICATION LIST DOCUMENTED IN MEDICAL RECORD: ICD-10-PCS | Mod: CPTII,S$GLB,, | Performed by: OBSTETRICS & GYNECOLOGY

## 2022-11-02 NOTE — PROGRESS NOTES
Patient ID: Sally Gordon is a 56 y.o. female.    Chief Complaint:  Pre-op Exam (Pt is here for a Pre Op exam [facility]** HSC,D&C w/ Bx on 22 visit.)      History of Present Illness  HPI  Patient here for preoperative visit for hysteroscopy with dilation and curettage and endometrial biopsy.  Patient with postmenopausal vaginal bleeding.  Endometrial biopsy was attempted in the office however due to cervical stenosis the Pipelle was unable to be passed through the cervix.  Patient does have multiple uterine fibroids and this may have been the cause of her inability to pass the Pipelle through the cervix.  She reports some pelvic pressure but mostly abnormal uterine bleeding.  She states is irregular.  Her last bleed was several months ago.  Ultrasound does show an enlarged uterus with multiple uterine leiomyoma.  Ultrasound did show a slightly thickened endometrium.  Options were discussed and she elected for hysteroscopy with dilation curettage in the operating room.  She states she has no other complaints or new symptoms.  Doing well otherwise.    GYN & OB History  No LMP recorded. Patient is perimenopausal.   Date of Last Pap: 2022    Past Medical History:   Diagnosis Date    Depression      History reviewed. No pertinent surgical history.  Review of patient's allergies indicates:  No Known Allergies  Current Outpatient Medications on File Prior to Visit   Medication Sig Dispense Refill    atorvastatin (LIPITOR) 10 MG tablet Take 1 tablet (10 mg total) by mouth once daily. 90 tablet 3    ferrous sulfate 325 (65 FE) MG EC tablet Take 1 tablet (325 mg total) by mouth every other day. 15 tablet 11    polyethylene glycol (GLYCOLAX) 17 gram/dose powder Take 17 g by mouth.      sertraline (ZOLOFT) 100 MG tablet Take 1 tablet (100 mg total) by mouth once daily. 90 tablet 3     No current facility-administered medications on file prior to visit.       OB History    Para Term  AB Living   0 0 0        "  SAB IAB Ectopic Multiple Live Births                   Review of Systems  Review of Systems   Constitutional:  Negative for fatigue, fever and unexpected weight change.   HENT:  Negative for nasal congestion.    Respiratory:  Negative for cough and shortness of breath.    Cardiovascular:  Negative for chest pain, palpitations and leg swelling.   Gastrointestinal:  Negative for abdominal pain, constipation, diarrhea, nausea, vomiting and reflux.   Endocrine: Negative for diabetes, hair loss and hot flashes.   Genitourinary:  Positive for menorrhagia and postmenopausal bleeding. Negative for bladder incontinence, decreased libido, dysmenorrhea, dyspareunia, dysuria, hot flashes, pelvic pain, vaginal discharge and vaginal dryness.   Musculoskeletal:  Negative for arthralgias, back pain and myalgias.   Integumentary:  Negative for rash, acne, hair changes, mole/lesion, breast mass, nipple discharge, breast skin changes and breast tenderness.   Neurological:  Negative for seizures, syncope and headaches.   Hematological:  Negative for adenopathy. Does not bruise/bleed easily.   Psychiatric/Behavioral:  Negative for depression and sleep disturbance. The patient is not nervous/anxious.    Breast: Negative for breast self exam, lump, mass, mastodynia, nipple discharge, skin changes and tenderness            Objective:   /76   Ht 5' 2" (1.575 m)   Wt 58.5 kg (129 lb)   BMI 23.59 kg/m²        Physical Exam:   Constitutional: She is oriented to person, place, and time. She appears well-developed and well-nourished. No distress.    HENT:   Head: Normocephalic and atraumatic.   Nose: Nose normal.            Abdominal: Soft. She exhibits no distension and no mass. There is no abdominal tenderness.             Musculoskeletal: Normal range of motion and moves all extremeties. No edema.       Neurological: She is alert and oriented to person, place, and time. No cranial nerve deficit.     Psychiatric: She has a normal " mood and affect. Her behavior is normal. Judgment and thought content normal.          Assessment:      1. Abnormal uterine bleeding    2. Uterine leiomyoma, unspecified location          Plan:      Patient consented for hysteroscopy with dilation curettage and endometrial biopsy.  Risks/benefits/alternatives of procedure were discussed at length including but not limited to the risk of bleeding, infection, damage surrounding tissue.  Patient expressed understanding risks elects to proceed.  All questions answered.  Proceed with surgery.

## 2022-11-09 ENCOUNTER — OUTSIDE PLACE OF SERVICE (OUTPATIENT)
Dept: OBSTETRICS AND GYNECOLOGY | Facility: CLINIC | Age: 57
End: 2022-11-09
Payer: COMMERCIAL

## 2022-11-09 PROCEDURE — 58558 PR HYSTEROSCOPY,W/ENDO BX: ICD-10-PCS | Mod: ,,, | Performed by: OBSTETRICS & GYNECOLOGY

## 2022-11-09 PROCEDURE — 58558 HYSTEROSCOPY BIOPSY: CPT | Mod: ,,, | Performed by: OBSTETRICS & GYNECOLOGY

## 2022-11-29 ENCOUNTER — OFFICE VISIT (OUTPATIENT)
Dept: OBSTETRICS AND GYNECOLOGY | Facility: CLINIC | Age: 57
End: 2022-11-29
Payer: COMMERCIAL

## 2022-11-29 VITALS
DIASTOLIC BLOOD PRESSURE: 66 MMHG | HEIGHT: 62 IN | SYSTOLIC BLOOD PRESSURE: 120 MMHG | BODY MASS INDEX: 23.92 KG/M2 | WEIGHT: 130 LBS

## 2022-11-29 DIAGNOSIS — Z09 EXAMINATION FOLLOWING SURGERY: Primary | ICD-10-CM

## 2022-11-29 DIAGNOSIS — N93.9 ABNORMAL UTERINE BLEEDING: ICD-10-CM

## 2022-11-29 PROCEDURE — 3078F PR MOST RECENT DIASTOLIC BLOOD PRESSURE < 80 MM HG: ICD-10-PCS | Mod: CPTII,S$GLB,, | Performed by: OBSTETRICS & GYNECOLOGY

## 2022-11-29 PROCEDURE — 3078F DIAST BP <80 MM HG: CPT | Mod: CPTII,S$GLB,, | Performed by: OBSTETRICS & GYNECOLOGY

## 2022-11-29 PROCEDURE — 3008F PR BODY MASS INDEX (BMI) DOCUMENTED: ICD-10-PCS | Mod: CPTII,S$GLB,, | Performed by: OBSTETRICS & GYNECOLOGY

## 2022-11-29 PROCEDURE — 99024 PR POST-OP FOLLOW-UP VISIT: ICD-10-PCS | Mod: S$GLB,,, | Performed by: OBSTETRICS & GYNECOLOGY

## 2022-11-29 PROCEDURE — 3074F SYST BP LT 130 MM HG: CPT | Mod: CPTII,S$GLB,, | Performed by: OBSTETRICS & GYNECOLOGY

## 2022-11-29 PROCEDURE — 1159F PR MEDICATION LIST DOCUMENTED IN MEDICAL RECORD: ICD-10-PCS | Mod: CPTII,S$GLB,, | Performed by: OBSTETRICS & GYNECOLOGY

## 2022-11-29 PROCEDURE — 3008F BODY MASS INDEX DOCD: CPT | Mod: CPTII,S$GLB,, | Performed by: OBSTETRICS & GYNECOLOGY

## 2022-11-29 PROCEDURE — 1159F MED LIST DOCD IN RCRD: CPT | Mod: CPTII,S$GLB,, | Performed by: OBSTETRICS & GYNECOLOGY

## 2022-11-29 PROCEDURE — 3074F PR MOST RECENT SYSTOLIC BLOOD PRESSURE < 130 MM HG: ICD-10-PCS | Mod: CPTII,S$GLB,, | Performed by: OBSTETRICS & GYNECOLOGY

## 2022-11-29 PROCEDURE — 99024 POSTOP FOLLOW-UP VISIT: CPT | Mod: S$GLB,,, | Performed by: OBSTETRICS & GYNECOLOGY

## 2022-11-29 NOTE — PROGRESS NOTES
"Chief Complaint   Patient presents with    Post-op Evaluation     Pt is here for a Post op Evaluation SRG**HSC, D&C w/ BX on 11/9/22         History of Present Illness:   Pateint presents today  3 weeks postop, status post HSC, D&C w/ Bx, with complaint of no pain. The patient is not having any pain.  She had some light spotting after the procedure however that has now resolved.  She does also report a little bit of mild stress urinary incontinence when she coughs sneezes runs jumps but is not to the point where she needs to use a panty liner.    Pathology: Discussion of test results with performing physician    Past medical and surgical history reviewed.   I have reviewed the patient's medical history in detail and updated the computerized patient record.    Physical exam:  /66   Ht 5' 2" (1.575 m)   Wt 59 kg (130 lb)   BMI 23.78 kg/m²   General:  Well-developed, well-nourished female in no acute distress  HEENT:  Normocephalic, atraumatic, extraocular muscles intact, moist mucous membranes  Breasts: deferred  Abdominal:  Soft, nontender, nondistended, incision clean dry and intact.   Extremities:  Warm, dry, no clubbing/cyanosis/edema  Neurological:  For cranial nerves 2-12 grossly intact   Dermatologic:  No rashes/lesions/bruising  Psychiatric:  Appropriate mood, affect, speech    Assessment:  Status post  hysteroscopy with dilation curettage and removal of endometrial polyp    Plan:  Doing well.  Benign endometrium and polyp noted.  Routine postop care.  Cleared from a surgery standpoint.  Elects for observation at this time.  Return in 6 months.      "

## 2022-11-30 ENCOUNTER — OFFICE VISIT (OUTPATIENT)
Dept: FAMILY MEDICINE | Facility: CLINIC | Age: 57
End: 2022-11-30
Payer: COMMERCIAL

## 2022-11-30 VITALS
DIASTOLIC BLOOD PRESSURE: 80 MMHG | WEIGHT: 131 LBS | OXYGEN SATURATION: 97 % | HEART RATE: 87 BPM | TEMPERATURE: 98 F | SYSTOLIC BLOOD PRESSURE: 138 MMHG | BODY MASS INDEX: 24.11 KG/M2 | HEIGHT: 62 IN

## 2022-11-30 DIAGNOSIS — F41.8 MIXED ANXIETY DEPRESSIVE DISORDER: ICD-10-CM

## 2022-11-30 DIAGNOSIS — D50.0 IRON DEFICIENCY ANEMIA DUE TO CHRONIC BLOOD LOSS: Primary | ICD-10-CM

## 2022-11-30 DIAGNOSIS — E78.00 PURE HYPERCHOLESTEROLEMIA, UNSPECIFIED: ICD-10-CM

## 2022-11-30 PROCEDURE — 3075F PR MOST RECENT SYSTOLIC BLOOD PRESS GE 130-139MM HG: ICD-10-PCS | Mod: CPTII,S$GLB,, | Performed by: FAMILY MEDICINE

## 2022-11-30 PROCEDURE — 99214 OFFICE O/P EST MOD 30 MIN: CPT | Mod: S$GLB,,, | Performed by: FAMILY MEDICINE

## 2022-11-30 PROCEDURE — 99214 PR OFFICE/OUTPT VISIT, EST, LEVL IV, 30-39 MIN: ICD-10-PCS | Mod: S$GLB,,, | Performed by: FAMILY MEDICINE

## 2022-11-30 PROCEDURE — 1159F MED LIST DOCD IN RCRD: CPT | Mod: CPTII,S$GLB,, | Performed by: FAMILY MEDICINE

## 2022-11-30 PROCEDURE — 3008F BODY MASS INDEX DOCD: CPT | Mod: CPTII,S$GLB,, | Performed by: FAMILY MEDICINE

## 2022-11-30 PROCEDURE — 3079F DIAST BP 80-89 MM HG: CPT | Mod: CPTII,S$GLB,, | Performed by: FAMILY MEDICINE

## 2022-11-30 PROCEDURE — 1159F PR MEDICATION LIST DOCUMENTED IN MEDICAL RECORD: ICD-10-PCS | Mod: CPTII,S$GLB,, | Performed by: FAMILY MEDICINE

## 2022-11-30 PROCEDURE — 3079F PR MOST RECENT DIASTOLIC BLOOD PRESSURE 80-89 MM HG: ICD-10-PCS | Mod: CPTII,S$GLB,, | Performed by: FAMILY MEDICINE

## 2022-11-30 PROCEDURE — 3075F SYST BP GE 130 - 139MM HG: CPT | Mod: CPTII,S$GLB,, | Performed by: FAMILY MEDICINE

## 2022-11-30 PROCEDURE — 3008F PR BODY MASS INDEX (BMI) DOCUMENTED: ICD-10-PCS | Mod: CPTII,S$GLB,, | Performed by: FAMILY MEDICINE

## 2022-11-30 NOTE — PATIENT INSTRUCTIONS
Doing well  Blood counts all look good  Start back on iron after procedure    Follow up 6 months    Sent shingles vaccine to [facility]

## 2022-11-30 NOTE — PROGRESS NOTES
"  Family Medicine Note  [facility]    Chief Complaint   Patient presents with    Follow-up     3 month Fu. NO complaints.        HPI:  Last visit found to be profoundly anemia - has since had workup with both GI and  - GYN - has capsule endoscopy upcoming, and underwent hysteroscopy with D&C.  Reports feeling good overall.    H/H markedly better    /66 just yesterday    Anxiety stable  Hyperlipidemia stable on appropriate intensity statin therapy      ROS: as above    Vitals:    11/30/22 0843 11/30/22 0844 11/30/22 0902   BP: (!) 160/74 (!) 152/80 138/80   Pulse: 87     Temp: 98.2 °F (36.8 °C)     SpO2: 97%     Weight: 59.4 kg (131 lb)     Height: 5' 2" (1.575 m)        Body mass index is 23.96 kg/m².      General:  AOx3, well nourished and developed in no acute distress  Eyes:  PERRLA, EOMI, vision intact grossly  ENT:  normal hearing, moist oral mucosa  Neck:  trachea midline with no masses or thyromegaly  Heart:  RRR, no murmurs.  No edema noted, extremities warm and well perfused  Lungs:  clear to auscultation bilaterally with symmetric chest movement  Abdomen:  Soft, nontender, nondistended.  Normal bowel sounds  Musculoskeletal:  Normal gait.  Normal posture.  Normal muscular development with no joint swelling.  Neurological:  CN II-XII grossly intact. Symmetric strength and sensation  Psych:  Normal mood and affect.  Able to demonstrate good judgement and personal insight.      Assessment/Plan:    Iron deficiency anemia due to chronic blood loss    Pure hypercholesterolemia, unspecified    Mixed anxiety depressive disorder        Resume iron therapy after endoscopy done  Stable on statin  Stable on medication    "

## 2022-12-28 ENCOUNTER — PATIENT MESSAGE (OUTPATIENT)
Dept: OBSTETRICS AND GYNECOLOGY | Facility: CLINIC | Age: 57
End: 2022-12-28
Payer: COMMERCIAL

## 2022-12-28 ENCOUNTER — TELEPHONE (OUTPATIENT)
Dept: OBSTETRICS AND GYNECOLOGY | Facility: CLINIC | Age: 57
End: 2022-12-28
Payer: COMMERCIAL

## 2022-12-28 DIAGNOSIS — Z12.31 SCREENING MAMMOGRAM, ENCOUNTER FOR: Primary | ICD-10-CM

## 2022-12-28 NOTE — TELEPHONE ENCOUNTER
Called Pt, NA. LVM stating Mammogram was normal.        ----- Message from Raheem Newby MA sent at 12/27/2022  5:37 PM CST -----  Contact: PT    ----- Message -----  From: Raheem Newby MA  Sent: 12/23/2022   4:30 PM CST  To: Raheem Newby MA      ----- Message -----  From: Milagros Gupta NP  Sent: 12/23/2022  11:35 AM CST  To: Raheem Newby MA    Please notify patient her mammogram was normal. Thank you.  ----- Message -----  From: Maureen Truong  Sent: 12/23/2022  10:06 AM CST  To: [facility] Staff    Type:  Test Results    Who Called:  PT  Name of Test (Lab/Mammo/Etc):  LABS AND ANNUAL  Date of Test:  8/29  Ordering Provider:  CUAUHTEMOC  Where the test was performed:  [facility]  Best Call Back Number:  [phone]  Additional Information:  PLEASE CALL, IT IS NOT SHOWING IN HER CHART RESULTS.

## 2022-12-28 NOTE — TELEPHONE ENCOUNTER
----- Message from Raheem Newby MA sent at 12/23/2022  4:30 PM CST -----  Contact: PT    ----- Message -----  From: Milagros Gupta NP  Sent: 12/23/2022  11:35 AM CST  To: Raheem Newby MA    Please notify patient her mammogram was normal. Thank you.  ----- Message -----  From: Maureen Truong  Sent: 12/23/2022  10:06 AM CST  To: [facility] Staff    Type:  Test Results    Who Called:  PT  Name of Test (Lab/Mammo/Etc):  LABS AND ANNUAL  Date of Test:  8/29  Ordering Provider:  CUAUHTEMOC  Where the test was performed:  [facility]  Best Call Back Number:  [phone]  Additional Information:  PLEASE CALL, IT IS NOT SHOWING IN HER CHART RESULTS.

## 2023-04-19 ENCOUNTER — TELEPHONE (OUTPATIENT)
Dept: FAMILY MEDICINE | Facility: CLINIC | Age: 58
End: 2023-04-19
Payer: COMMERCIAL

## 2023-04-19 NOTE — TELEPHONE ENCOUNTER
I contacted the patient to reschedule her her appointment for 05/29/2023, no answer I left her voicemail.

## 2023-06-05 ENCOUNTER — OFFICE VISIT (OUTPATIENT)
Dept: FAMILY MEDICINE | Facility: CLINIC | Age: 58
End: 2023-06-05
Payer: COMMERCIAL

## 2023-06-05 VITALS
HEART RATE: 88 BPM | SYSTOLIC BLOOD PRESSURE: 108 MMHG | BODY MASS INDEX: 18.28 KG/M2 | TEMPERATURE: 98 F | DIASTOLIC BLOOD PRESSURE: 70 MMHG | OXYGEN SATURATION: 98 % | HEIGHT: 72 IN | WEIGHT: 135 LBS

## 2023-06-05 DIAGNOSIS — D50.0 IRON DEFICIENCY ANEMIA DUE TO CHRONIC BLOOD LOSS: ICD-10-CM

## 2023-06-05 DIAGNOSIS — E78.00 PURE HYPERCHOLESTEROLEMIA, UNSPECIFIED: ICD-10-CM

## 2023-06-05 DIAGNOSIS — F41.8 MIXED ANXIETY DEPRESSIVE DISORDER: Primary | ICD-10-CM

## 2023-06-05 DIAGNOSIS — L20.82 FLEXURAL ECZEMA: ICD-10-CM

## 2023-06-05 DIAGNOSIS — Z00.00 ANNUAL PHYSICAL EXAM: ICD-10-CM

## 2023-06-05 LAB
ALBUMIN SERPL BCP-MCNC: 4.1 G/DL (ref 3.5–5.2)
ALP SERPL-CCNC: 81 U/L (ref 55–135)
ALT SERPL W/O P-5'-P-CCNC: 21 U/L (ref 10–44)
ANION GAP SERPL CALC-SCNC: 9 MMOL/L (ref 8–16)
AST SERPL-CCNC: 23 U/L (ref 10–40)
BASOPHILS # BLD AUTO: 0.05 K/UL (ref 0–0.2)
BASOPHILS NFR BLD: 1.2 % (ref 0–1.9)
BILIRUB SERPL-MCNC: 0.4 MG/DL (ref 0.1–1)
BUN SERPL-MCNC: 17 MG/DL (ref 6–20)
CALCIUM SERPL-MCNC: 9.7 MG/DL (ref 8.7–10.5)
CHLORIDE SERPL-SCNC: 105 MMOL/L (ref 95–110)
CHOLEST SERPL-MCNC: 172 MG/DL (ref 120–199)
CHOLEST/HDLC SERPL: 2.5 {RATIO} (ref 2–5)
CO2 SERPL-SCNC: 28 MMOL/L (ref 23–29)
CREAT SERPL-MCNC: 0.8 MG/DL (ref 0.5–1.4)
DIFFERENTIAL METHOD: NORMAL
EOSINOPHIL # BLD AUTO: 0 K/UL (ref 0–0.5)
EOSINOPHIL NFR BLD: 1 % (ref 0–8)
ERYTHROCYTE [DISTWIDTH] IN BLOOD BY AUTOMATED COUNT: 12.1 % (ref 11.5–14.5)
EST. GFR  (NO RACE VARIABLE): >60 ML/MIN/1.73 M^2
GLUCOSE SERPL-MCNC: 93 MG/DL (ref 70–110)
HCT VFR BLD AUTO: 40.9 % (ref 37–48.5)
HDLC SERPL-MCNC: 69 MG/DL (ref 40–75)
HDLC SERPL: 40.1 % (ref 20–50)
HGB BLD-MCNC: 13.7 G/DL (ref 12–16)
IMM GRANULOCYTES # BLD AUTO: 0.01 K/UL (ref 0–0.04)
IMM GRANULOCYTES NFR BLD AUTO: 0.2 % (ref 0–0.5)
LDLC SERPL CALC-MCNC: 91 MG/DL (ref 63–159)
LYMPHOCYTES # BLD AUTO: 1.5 K/UL (ref 1–4.8)
LYMPHOCYTES NFR BLD: 37 % (ref 18–48)
MCH RBC QN AUTO: 30.6 PG (ref 27–31)
MCHC RBC AUTO-ENTMCNC: 33.5 G/DL (ref 32–36)
MCV RBC AUTO: 92 FL (ref 82–98)
MONOCYTES # BLD AUTO: 0.3 K/UL (ref 0.3–1)
MONOCYTES NFR BLD: 6.5 % (ref 4–15)
NEUTROPHILS # BLD AUTO: 2.3 K/UL (ref 1.8–7.7)
NEUTROPHILS NFR BLD: 54.1 % (ref 38–73)
NONHDLC SERPL-MCNC: 103 MG/DL
NRBC BLD-RTO: 0 /100 WBC
PLATELET # BLD AUTO: 233 K/UL (ref 150–450)
PMV BLD AUTO: 10.3 FL (ref 9.2–12.9)
POTASSIUM SERPL-SCNC: 4.2 MMOL/L (ref 3.5–5.1)
PROT SERPL-MCNC: 7.6 G/DL (ref 6–8.4)
RBC # BLD AUTO: 4.47 M/UL (ref 4–5.4)
SODIUM SERPL-SCNC: 142 MMOL/L (ref 136–145)
TRIGL SERPL-MCNC: 60 MG/DL (ref 30–150)
TSH SERPL DL<=0.005 MIU/L-ACNC: 1.68 UIU/ML (ref 0.4–4)
WBC # BLD AUTO: 4.16 K/UL (ref 3.9–12.7)

## 2023-06-05 PROCEDURE — 85025 COMPLETE CBC W/AUTO DIFF WBC: CPT | Performed by: FAMILY MEDICINE

## 2023-06-05 PROCEDURE — 3074F SYST BP LT 130 MM HG: CPT | Mod: CPTII,S$GLB,, | Performed by: FAMILY MEDICINE

## 2023-06-05 PROCEDURE — 80053 COMPREHEN METABOLIC PANEL: CPT | Performed by: FAMILY MEDICINE

## 2023-06-05 PROCEDURE — 99396 PR PREVENTIVE VISIT,EST,40-64: ICD-10-PCS | Mod: S$GLB,,, | Performed by: FAMILY MEDICINE

## 2023-06-05 PROCEDURE — 84443 ASSAY THYROID STIM HORMONE: CPT | Performed by: FAMILY MEDICINE

## 2023-06-05 PROCEDURE — 99213 PR OFFICE/OUTPT VISIT, EST, LEVL III, 20-29 MIN: ICD-10-PCS | Mod: 25,S$GLB,, | Performed by: FAMILY MEDICINE

## 2023-06-05 PROCEDURE — 3078F DIAST BP <80 MM HG: CPT | Mod: CPTII,S$GLB,, | Performed by: FAMILY MEDICINE

## 2023-06-05 PROCEDURE — 99213 OFFICE O/P EST LOW 20 MIN: CPT | Mod: 25,S$GLB,, | Performed by: FAMILY MEDICINE

## 2023-06-05 PROCEDURE — 99396 PREV VISIT EST AGE 40-64: CPT | Mod: S$GLB,,, | Performed by: FAMILY MEDICINE

## 2023-06-05 PROCEDURE — 3074F PR MOST RECENT SYSTOLIC BLOOD PRESSURE < 130 MM HG: ICD-10-PCS | Mod: CPTII,S$GLB,, | Performed by: FAMILY MEDICINE

## 2023-06-05 PROCEDURE — 3008F BODY MASS INDEX DOCD: CPT | Mod: CPTII,S$GLB,, | Performed by: FAMILY MEDICINE

## 2023-06-05 PROCEDURE — 3008F PR BODY MASS INDEX (BMI) DOCUMENTED: ICD-10-PCS | Mod: CPTII,S$GLB,, | Performed by: FAMILY MEDICINE

## 2023-06-05 PROCEDURE — 3078F PR MOST RECENT DIASTOLIC BLOOD PRESSURE < 80 MM HG: ICD-10-PCS | Mod: CPTII,S$GLB,, | Performed by: FAMILY MEDICINE

## 2023-06-05 PROCEDURE — 1159F MED LIST DOCD IN RCRD: CPT | Mod: CPTII,S$GLB,, | Performed by: FAMILY MEDICINE

## 2023-06-05 PROCEDURE — 1159F PR MEDICATION LIST DOCUMENTED IN MEDICAL RECORD: ICD-10-PCS | Mod: CPTII,S$GLB,, | Performed by: FAMILY MEDICINE

## 2023-06-05 PROCEDURE — 80061 LIPID PANEL: CPT | Performed by: FAMILY MEDICINE

## 2023-06-05 RX ORDER — TRIAMCINOLONE ACETONIDE 1 MG/G
CREAM TOPICAL 2 TIMES DAILY
Qty: 80 G | Refills: 1 | Status: SHIPPED | OUTPATIENT
Start: 2023-06-05

## 2023-06-05 NOTE — PROGRESS NOTES
"    [facility] [place] [place]    Family Medicine Office Visit    Chief Complaint   Patient presents with    Annual Exam        HPI:  6 month follow up for chronic conditions:  Now sees GI for notable KENYETTA - sees OBGYN as well.    UTD with mammogram  Hyperlipidemia stable on appropriate intensity statin therapy  Anxiety stable on medication    Unrelated, has discoloration to elbow R    ROS: as above    Vitals:    06/05/23 0920   BP: 108/70   BP Location: Right arm   Patient Position: Sitting   BP Method: Medium (Manual)   Pulse: 88   Temp: 97.5 °F (36.4 °C)   SpO2: 98%   Weight: 61.2 kg (135 lb)   Height: 6' 2" (1.88 m)      Body mass index is 17.33 kg/m².      General:  AOx3, well nourished and developed in no acute distress  Eyes:  PERRLA, EOMI, vision intact grossly  ENT:  normal hearing, moist oral mucosa  Neck:  trachea midline with no masses or thyromegaly  Heart:  RRR, no murmurs.  No edema noted, extremities warm and well perfused  Lungs:  clear to auscultation bilaterally with symmetric chest movement  Abdomen:  Soft, nontender, nondistended.  Normal bowel sounds  Musculoskeletal:  Normal gait.  Normal posture.  Normal muscular development with no joint swelling.  Neurological:  CN II-XII grossly intact. Symmetric strength and sensation  Psych:  Normal mood and affect.  Able to demonstrate good judgement and personal insight.  Slightly dark/rough skin to R elbow.  No pain      Assessment/Plan:    1. Mixed anxiety depressive disorder    2. Pure hypercholesterolemia, unspecified  -     Lipid Panel; Future; Expected date: 06/05/2023  -     TSH; Future; Expected date: 06/05/2023    3. Iron deficiency anemia due to chronic blood loss  -     CBC Auto Differential; Future; Expected date: 06/05/2023  -     Comprehensive Metabolic Panel; Future; Expected date: 06/05/2023  -     TSH; Future; Expected date: 06/05/2023    4. Annual physical exam    5. Flexural eczema          Stable on " medication  Check lipids today  Check CBC, although last check showed normal blood counts.  UTD with screening    __________________________________________    Separate for annual exam, patient reports discoloration to elbow. No injury or pain, but darker skin with slight rough texture.  Will start steroid cream for possible eczema

## 2023-08-28 RX ORDER — ATORVASTATIN CALCIUM 10 MG/1
10 TABLET, FILM COATED ORAL
Qty: 90 TABLET | Refills: 3 | Status: SHIPPED | OUTPATIENT
Start: 2023-08-28 | End: 2023-12-05 | Stop reason: SDUPTHER

## 2023-08-28 RX ORDER — SERTRALINE HYDROCHLORIDE 100 MG/1
100 TABLET, FILM COATED ORAL
Qty: 90 TABLET | Refills: 3 | Status: SHIPPED | OUTPATIENT
Start: 2023-08-28 | End: 2023-12-05 | Stop reason: SDUPTHER

## 2023-08-28 NOTE — TELEPHONE ENCOUNTER
Last office visit: 6/5/2023  Rx Auth Request  Received: Today  Marko Degroot Staff  Caller: Unspecified (Today,  3:54 AM)

## 2023-09-04 PROBLEM — Z00.00 ANNUAL PHYSICAL EXAM: Status: RESOLVED | Noted: 2023-06-05 | Resolved: 2023-09-04

## 2023-09-06 DIAGNOSIS — Z12.31 OTHER SCREENING MAMMOGRAM: ICD-10-CM

## 2023-10-27 NOTE — ADDENDUM NOTE
Addended by: TALA MONTEJO on: 10/18/2022 09:03 AM     Modules accepted: Level of Service     DISCHARGE

## 2023-12-04 ENCOUNTER — PATIENT MESSAGE (OUTPATIENT)
Dept: FAMILY MEDICINE | Facility: CLINIC | Age: 58
End: 2023-12-04
Payer: COMMERCIAL

## 2023-12-05 ENCOUNTER — PATIENT OUTREACH (OUTPATIENT)
Dept: ADMINISTRATIVE | Facility: HOSPITAL | Age: 58
End: 2023-12-05
Payer: COMMERCIAL

## 2023-12-05 ENCOUNTER — OFFICE VISIT (OUTPATIENT)
Dept: FAMILY MEDICINE | Facility: CLINIC | Age: 58
End: 2023-12-05
Payer: COMMERCIAL

## 2023-12-05 VITALS
WEIGHT: 142.81 LBS | DIASTOLIC BLOOD PRESSURE: 80 MMHG | TEMPERATURE: 99 F | HEART RATE: 94 BPM | SYSTOLIC BLOOD PRESSURE: 130 MMHG | OXYGEN SATURATION: 96 % | HEIGHT: 72 IN | BODY MASS INDEX: 19.34 KG/M2

## 2023-12-05 DIAGNOSIS — F41.8 MIXED ANXIETY DEPRESSIVE DISORDER: ICD-10-CM

## 2023-12-05 DIAGNOSIS — E78.00 PURE HYPERCHOLESTEROLEMIA, UNSPECIFIED: ICD-10-CM

## 2023-12-05 DIAGNOSIS — D50.9 MICROCYTIC ANEMIA: Primary | ICD-10-CM

## 2023-12-05 PROCEDURE — 3079F DIAST BP 80-89 MM HG: CPT | Mod: CPTII,S$GLB,, | Performed by: FAMILY MEDICINE

## 2023-12-05 PROCEDURE — 3079F PR MOST RECENT DIASTOLIC BLOOD PRESSURE 80-89 MM HG: ICD-10-PCS | Mod: CPTII,S$GLB,, | Performed by: FAMILY MEDICINE

## 2023-12-05 PROCEDURE — 3008F BODY MASS INDEX DOCD: CPT | Mod: CPTII,S$GLB,, | Performed by: FAMILY MEDICINE

## 2023-12-05 PROCEDURE — 3008F PR BODY MASS INDEX (BMI) DOCUMENTED: ICD-10-PCS | Mod: CPTII,S$GLB,, | Performed by: FAMILY MEDICINE

## 2023-12-05 PROCEDURE — 1159F PR MEDICATION LIST DOCUMENTED IN MEDICAL RECORD: ICD-10-PCS | Mod: CPTII,S$GLB,, | Performed by: FAMILY MEDICINE

## 2023-12-05 PROCEDURE — 99214 PR OFFICE/OUTPT VISIT, EST, LEVL IV, 30-39 MIN: ICD-10-PCS | Mod: S$GLB,,, | Performed by: FAMILY MEDICINE

## 2023-12-05 PROCEDURE — 3075F PR MOST RECENT SYSTOLIC BLOOD PRESS GE 130-139MM HG: ICD-10-PCS | Mod: CPTII,S$GLB,, | Performed by: FAMILY MEDICINE

## 2023-12-05 PROCEDURE — 1159F MED LIST DOCD IN RCRD: CPT | Mod: CPTII,S$GLB,, | Performed by: FAMILY MEDICINE

## 2023-12-05 PROCEDURE — 3075F SYST BP GE 130 - 139MM HG: CPT | Mod: CPTII,S$GLB,, | Performed by: FAMILY MEDICINE

## 2023-12-05 PROCEDURE — 99214 OFFICE O/P EST MOD 30 MIN: CPT | Mod: S$GLB,,, | Performed by: FAMILY MEDICINE

## 2023-12-05 RX ORDER — ATORVASTATIN CALCIUM 10 MG/1
10 TABLET, FILM COATED ORAL DAILY
Qty: 90 TABLET | Refills: 3 | Status: SHIPPED | OUTPATIENT
Start: 2023-12-05

## 2023-12-05 RX ORDER — SERTRALINE HYDROCHLORIDE 100 MG/1
100 TABLET, FILM COATED ORAL DAILY
Qty: 90 TABLET | Refills: 3 | Status: SHIPPED | OUTPATIENT
Start: 2023-12-05

## 2023-12-05 NOTE — PROGRESS NOTES
Population Health Chart Review & Patient Outreach Details    Outreach Performed: NO    Additional Pop Health Notes:           Updates Requested / Reviewed:      Updated Care Coordination Note and Care Team Updated         Health Maintenance Topics Overdue:    Health Maintenance Due   Topic Date Due    TETANUS VACCINE  Never done    Shingles Vaccine (1 of 2) Never done    Colorectal Cancer Screening  08/24/2023    Mammogram  08/29/2023    Influenza Vaccine (1) 09/01/2023    COVID-19 Vaccine (4 - 2023-24 season) 09/01/2023         Health Maintenance Topic(s) Outreach Outcomes & Actions Taken:    Colorectal Cancer Screening - Outreach Outcomes & Actions Taken  : External Records Requested & Care Team Updated if Applicable

## 2023-12-05 NOTE — PROGRESS NOTES
"    [facility] [place] [place]    Family Medicine Office Visit    Chief Complaint   Patient presents with    Follow-up        HPI:  Followup chronic conditions    KENYETTA now resolved on supplementation - doing well  Hyperlipidemia stable on appropriate intensity statin therapy  Anxiety stable    ROS: as above    Vitals:    12/05/23 0921   BP: 130/80   Pulse: 94   Temp: 98.6 °F (37 °C)   SpO2: 96%   Weight: 64.8 kg (142 lb 12.8 oz)   Height: 6' 2" (1.88 m)      Body mass index is 18.33 kg/m².      General:  AOx3, well nourished and developed in no acute distress  Eyes:  PERRLA, EOMI, vision intact grossly  ENT:  normal hearing, moist oral mucosa  Neck:  trachea midline with no masses or thyromegaly  Heart:  RRR, no murmurs.  No edema noted, extremities warm and well perfused  Lungs:  clear to auscultation bilaterally with symmetric chest movement  Abdomen:  Soft, nontender, nondistended.  Normal bowel sounds  Musculoskeletal:  Normal gait.  Normal posture.  Normal muscular development with no joint swelling.  Neurological:  CN II-XII grossly intact. Symmetric strength and sensation  Psych:  Normal mood and affect.  Able to demonstrate good judgement and personal insight.      Assessment/Plan:    1. Microcytic anemia    2. Pure hypercholesterolemia, unspecified    3. Mixed anxiety depressive disorder    Other orders  -     atorvastatin (LIPITOR) 10 MG tablet; Take 1 tablet (10 mg total) by mouth once daily.  Dispense: 90 tablet; Refill: 3  -     sertraline (ZOLOFT) 100 MG tablet; Take 1 tablet (100 mg total) by mouth once daily.  Dispense: 90 tablet; Refill: 3       Stable on iron  Stable on statin  Stable on zoloft  Filled medication, followup 6 months      "

## 2023-12-05 NOTE — LETTER
AUTHORIZATION FOR RELEASE OF   CONFIDENTIAL INFORMATION    Dear Dr Brooke,    We are seeing Sally Gordon, date of birth 1965, in the clinic at [facility]. Anthony Nixon MD is the patient's PCP. Sally Gordon has an outstanding lab/procedure at the time we reviewed her chart. In order to help keep her health information updated, she has authorized us to request the following medical record(s):        (  )  MAMMOGRAM                                      ( X )  COLONOSCOPY      (  )  PAP SMEAR                                          (  )  OUTSIDE LAB RESULTS     (  )  DEXA SCAN                                          (  )  EYE EXAM            (  )  FOOT EXAM                                          (  )  ENTIRE RECORD     (  )  OUTSIDE IMMUNIZATIONS                 (  )  _______________         Please fax records to [facility], Hulin, Michael P., MD at [phone]    Thanks so much and have a great day!    Paloma Moise LPN [facility]  [address] [place] [address] [place] [address]  P- [phone]  F [phone]            Patient Name: Sally Gordon  : 1965  Patient Phone #: [phone]

## 2023-12-28 ENCOUNTER — PATIENT OUTREACH (OUTPATIENT)
Dept: ADMINISTRATIVE | Facility: HOSPITAL | Age: 58
End: 2023-12-28
Payer: COMMERCIAL

## 2023-12-28 ENCOUNTER — PATIENT MESSAGE (OUTPATIENT)
Dept: ADMINISTRATIVE | Facility: HOSPITAL | Age: 58
End: 2023-12-28
Payer: COMMERCIAL

## 2023-12-28 NOTE — PROGRESS NOTES
Population Health Chart Review & Patient Outreach Details    Outreach Performed: YES Portal    Additional [facility] Health Notes:           Updates Requested / Reviewed:      Updated Care Coordination Note, Care Everywhere, , Care Team Updated, and Immunizations Reconciliation Completed or Queried: [facility] Topics Overdue:    Health Maintenance Due   Topic Date Due    TETANUS VACCINE  Never done    Shingles Vaccine (1 of 2) Never done    Mammogram  08/29/2023    Influenza Vaccine (1) 09/01/2023    COVID-19 Vaccine (4 - 2023-24 season) 09/01/2023         Health Maintenance Topic(s) Outreach Outcomes & Actions Taken:    Colorectal Cancer Screening - Outreach Outcomes & Actions Taken  : External Records Uploaded, Care Team Updated, & History Updated if Applicable    Breast Cancer Screening - Outreach Outcomes & Actions Taken  : Pt Will Schedule with External Provider / Order Routed & Care Team Updated if Applicable

## 2024-05-17 NOTE — TELEPHONE ENCOUNTER
Message given to Dr. Huff  
Please review message and advise.   
Printed message for Dr. Huff  
Pt left a message stating she has not been contacted to schedule surgery. I have not been given any information regarding this. Procedure visit note from 9/29/22 is not yet complete. Please advise.   
Pt was seen in clinic 11/29/22 by Dr. Huff  
 Post op Check: 60 yo M POD #0  RALP/LND,     Pt seen and examined, c/o incisional pain, due for dilaudid now. Denies SOB/CP/N/V.     Vital Signs Last 24 Hrs  T(C): 36.5 (16 May 2024 14:00), Max: 36.7 (16 May 2024 06:11)  T(F): 97.7 (16 May 2024 14:00), Max: 98.1 (16 May 2024 06:11)  HR: 64 (16 May 2024 14:15) (62 - 73)  BP: 123/68 (16 May 2024 14:15) (112/67 - 128/78)  BP(mean): 80 (16 May 2024 14:15) (69 - 103)  RR: 18 (16 May 2024 14:15) (10 - 23)  SpO2: 97% (16 May 2024 14:15) (93% - 98%)    Parameters below as of 16 May 2024 14:15  Patient On (Oxygen Delivery Method): nasal cannula  O2 Flow (L/min): 2      I&Os Summary  Garzon: 125 + yellow in proximal tubing with some heme sediment  16 May 2024 07:01  -  16 May 2024 14:49  --------------------------------------------------------  IN: 250 mL / OUT: 125 mL / NET: 125 mL        Physical Exam  Gen: NAD  Pulm: No respiratory distress, clear to auscultation  CV: Reg  Abd: Incisions c/d/i, obese, softly distended, nontender  : Garzon secured  Venodynes: In place                          14.3   13.02 )-----------( 318      ( 16 May 2024 13:36 )             43.3       05-16    140  |  101  |  14  ----------------------------<  147<H>  4.0   |  24  |  0.81    Ca    8.7      16 May 2024 13:36            
Overnight events:  None    Subjective:  Pt c/o slight incisional pain, controlled w/ rx, tolerating CLD, no N/V, + flatus, is hungry    Objective:    Vital signs  T(C): , Max: 36.9 (05-16-24 @ 21:53)  HR: 70 (05-17-24 @ 05:02)  BP: 123/61 (05-17-24 @ 05:02)  SpO2: 91% (05-17-24 @ 05:02)  Wt(kg): --    Output   Jamarcus: 1950 yellow  05-16 @ 07:01  -  05-17 @ 07:00  --------------------------------------------------------  IN: 375 mL / OUT: 2225 mL / NET: -1850 mL        Gen: NAD  Abd: incision c/d/i, softly distended, nontender,   : jamarcus secured    Labs                        12.7   8.28  )-----------( 292      ( 17 May 2024 05:33 )             38.0     17 May 2024 05:33    139    |  103    |  9      ----------------------------<  111    4.0     |  25     |  0.72     Ca    8.7        17 May 2024 05:33

## 2024-05-22 ENCOUNTER — PATIENT MESSAGE (OUTPATIENT)
Dept: ADMINISTRATIVE | Facility: HOSPITAL | Age: 59
End: 2024-05-22
Payer: COMMERCIAL

## 2024-05-28 ENCOUNTER — PATIENT OUTREACH (OUTPATIENT)
Dept: ADMINISTRATIVE | Facility: HOSPITAL | Age: 59
End: 2024-05-28
Payer: COMMERCIAL

## 2024-05-28 NOTE — PROGRESS NOTES
Population Health Chart Review & Patient Outreach Details      Additional [facility] Health Notes:               Updates Requested / Reviewed:      Updated Care Coordination Note         Health Maintenance Topics Overdue:      VBHM Score: 1     Mammogram                       Health Maintenance Topic(s) Outreach Outcomes & Actions Taken:    Breast Cancer Screening - Outreach Outcomes & Actions Taken  : Portal message sent out regarding pt's overdue Mammogram

## 2024-06-17 LAB — BCS RECOMMENDATION EXT: NORMAL

## 2024-06-24 ENCOUNTER — PATIENT OUTREACH (OUTPATIENT)
Dept: ADMINISTRATIVE | Facility: HOSPITAL | Age: 59
End: 2024-06-24
Payer: COMMERCIAL

## 2024-06-24 NOTE — PROGRESS NOTES
Population Health Chart Review & Patient Outreach Details      Additional Pop Health Notes:               Updates Requested / Reviewed:      Updated Care Coordination Note         Health Maintenance Topics Overdue:      VB Score: 1     Mammogram                       Health Maintenance Topic(s) Outreach Outcomes & Actions Taken:    Breast Cancer Screening - Outreach Outcomes & Actions Taken  : External Records Requested & Care Team Updated if Applicable

## 2024-06-24 NOTE — LETTER
"             FAX      AUTHORIZATION FOR RELEASE OF   CONFIDENTIAL INFORMATION      Release of Information,      We are seeing Sally Gordon, date of birth 1965, in the clinic at [facility]. Anthony Nixon MD is the patient's PCP. Sally Gordon has an outstanding lab/procedure at the time we reviewed their chart. In order to help keep their health information updated, Sally Gordon has authorized us to request the following medical records:    BILATERAL MAMMOGRAM SCREENING - (MOST RECENT WITHIN 2 YRS)          Please fax records to Anthony Nixon MD at [facility] [phone].    If you have any questions, please contact Maureen at [phone]    Thank you,    Maureen Gabriel LPN Clinical Care Coordinator  [facility] A. Consent for Examination and Treatment: I hereby authorize the providers and employees of [facility] ("[facility] to provide medical treatment/services which includes, but is not limited to, performing and administering tests and diagnostic procedures that are deemed necessary, Including, but not limited to, imaging examinations, blood tests and other laboratory procedures as may be required by the hospital, clinic, or may be ordered by my physician(s) or persons working under the general and/or special instructions of my physician(s).                   1.      I understand and agree that this consent covers all authorized persons, including but not limited to physicians, residents, nurse practitioners, physicians' assistants, specialists, consultants, student nurses, and independently contracted physicians, who are called upon by the physician in charge, to carry out the diagnostic procedures and medical or surgical treatment.  2.      I hereby authorize [facility] to retain or dispose of any specimens or tissue, should there be such remaining from any test or procedure.  3.      I hereby authorize " and give consent for [facility] providers and employees to take photographs, images or videotapes of such diagnostic, surgical or treatment procedures of Patient as may be required by [facility] or as may be ordered by a physician.  I further acknowledge and agree that [facility] may use cameras or other devices for patient monitoring.  4.      I am aware that the practice of medicine is not an exact science, and I acknowledge that no guarantees have been made to me as to the outcome of any tests, procedures or treatment.                   B. Authorization for Release of Information:  I understand that my insurance company and/or their agents may need information necessary to make determinations about payment/reimbursement.  I hereby provide authorization to release to all insurance companies, their successors, assignees, other parties with whom they may have contracted, or others acting on their behalf, that are involved with payment for any hospital and/or clinic charges incurred by the patient, any information that they request and deem necessary for payment/reimbursement, and/or quality review.  I further authorize the release of my health information to physicians or other health care practitioners on staff who are involved in my health care now and in the future, and to other health care providers, entities, or institutions for the purpose of my continued care and treatment, including referrals.     C. Medicare Patient's Certification and Authorization to Release Information and Payment Request:  I certify that the information given by me in applying for payment under Title XVIII of the Social Security Act is correct.  I authorize any lema of medical or other information about me to release to the Social Security Administration, or its intermediaries or carriers, any information needed for this or a related Medicare claim.  I request that payment of authorized benefits be made on my behalf.     D. Assignment of  Insurance Benefits:  I hereby authorize any and all insurance companies, health plans, defined benefit plans, health insurers or any entity that is or may be responsible for payment of my medical expenses to pay all hospital and medical benefits now due, and to become due and payable to me under any hospital benefits, sick benefits, injury benefits or any other benefit for services rendered to me, including Major Medical Benefits, direct to [facility] and all independently contracted physicians.  I assign any and all rights that I may have against any and all insurance companies, health plans, defined benefit plans, health insurers or any entity that is or may be responsible for payment of my medical expenses, including, but not limited to any right to appeal a denial of a claim, any right to bring any action, lawsuit, administrative proceeding, or other cause of action on my behalf.  I specifically assign my right to pursue litigation against any and all insurance companies, health plans, defined benefit plans, health insurers or any entity that is or may be responsible for payment of medical expenses based upon a refusal to pay charges.              REGISTRATION  AUTHORIZATION                    E. Valuables:  It is understood and agreed that [facility] is not liable for the damage to or loss of any money, jewelry, documents, dentures, eye glasses, hearing aids, prosthetics, or other property of value.     F. Computer Equipment:  I understand and agree that should I choose to use computer equipment owned by [facility] or if I choose to access the Internet via [facility]'s network, I do so at my own risk.  [facility] is not responsible for any damage to my computer equipment or to any damages of any type that might arise from my loss of equipment or data.                   G. Acceptance of Financial Responsibility:  I agree that in consideration of the services and supplies that have been or will be furnished to the patient,  I  am hereby obligated to pay all charges made for or on the account of the patient according to the standard rates (in effect at the time the services and supplies are delivered) established by [facility], including its Patient Financial Assistance Policy to the extent it is applicable.  I understand that I am responsible for all charges, or portions thereof, not covered by insurance or other sources.  Patient refunds will be distributed only after balances at all [facility] facilities are paid.                   H. Communication Authorization:  I hereby authorize [facility] and its representatives, along with any billing service or  who may work on their behalf, to contact me on my cell phone and/or home phone using prerecorded messages, artificial voice messages, automatic telephone dialing devices or other computer assisted technology, or by electronic mail, text messaging, or by any other form of electronic communication.  This includes, but is not limited to appointment reminders, yearly physical exam reminders, preventive care reminders, patient campaigns, welcome calls, and calls about account balances on my account or any account on which I am listed as a guarantor.  I understand I have the right to opt out of these communications at any time.     I.  Relationship Between Facility and Physician:  I understand that some, but not all, providers furnishing services to the patient are not employees or agents of [facility].  The patient is under the care and supervision of his/her attending physician, and it is the responsibility of the facility and its nursing staff to carry out the instructions of such physicians.  It is the responsibility of the patient's physician/designee to obtain the patient's informed consent, when required, for medical or surgical treatment, special diagnostic or therapeutic procedures, or hospital services rendered for the patient under the special instructions of the  physician/designee.     J. Notice of Private Practices:  I acknowledge I have received a copy of [facility]'s Notice of Privacy Practices.     K. Facility Directory:  I have discussed with the organization my desire to be either included or excluded in the facility directory.  I understand that if my choice is to opt-out of being identified in the facility directory that the facility will not provide any information about me such as my condition (e.g. Fair, stable, etc.) or my location in the facility (e.g. Room number, department).     L. LINKS:  For [place] Residents:  [facility] is a LINKS ([place] Immunization Network for Kids Statewide) participating facility.  LINKS is a [facility]-sponsored confidential computer system that helps you and your doctor keep track of you and your child's immunization history.  I acknowledge that I am allowing [facility] to share this information with [facility].  For [place] Residents:  [facility] is a MIIX ([place] Immunization Information eXchange) participant.  MISales[employer]ict is a [place] Department of Health-sponsored confidential computer system that helps you and your doctor keep track of you and your child's immunization history.  I acknowledge that I am allowing [facility] to share information with [facility].     M. Term:  This authorization is valid for this and subsequent care/treatment I receive at [facility] and will remain valid unless/until revoked in writing by me.      ACKNOWLEDGMENT OF POTENTIAL RISKS OF COVID-19 VACCINE  It is important that you, as the patient or patients legally authorized representative(s), understand and acknowledge the following, with regard to administration of the COVID-19 vaccine offered by [facility]:  The SARS-CoV-2 virus (COVID-19) has caused an unprecedented modern global pandemic that has mobilized scientists and drug manufacturers to work to create safe and effective vaccines to get the crisis under control.  No vaccine is released in the [place]  without undergoing rigorous, multi-layered testing and approval by the Food and Drug Administration.  During a public health emergency, however, vaccines can be released for patient administration by the FDA prior to completion of multi-phase clinical trials and approval. This is done by the FDAs granting of Emergency Use Authorization (EUA) when the vaccine meets reasonable thresholds for safety and effectiveness and people are in urgent need of care. Under an EUA, the FDA has found that known and potential benefits outweigh its known and potential risks.  The vaccine for which you are presenting to [facility] has been released under an EUA, which [facility] is honoring in its distribution of the vaccine to the public. While the FDAs authorization indicates its belief that usage is recommended over possible risks, there is still the possibility that unknown risks of the vaccine could exist.  By signing this document, you acknowledge and assume these risks. Further, you waive any and all claims of liability against and hold harmless any [facility] entity or provider for any harm caused to you by said possible unknown risks of the vaccine.        N. [facility]:  As used in this document, [facility] means all [facility] affiliated entities including all health centers, surgery centers, clinics, and hospitals.  It includes more specifically, the following entities: [facility], a not for profit [facility], and its subsidiaries and affiliates, including [facility], [facility], [facility], [facility], [facility], [facility], L.L.C.d/b/a [facility], L.L.C.d/b/a [facility] [employer] As manager of [place]  [facility], [facility], L.L.C, [facility] [employer], L.L.C.d/b/a [facility], [facility]., [facility], L.L[facility]C., and [facility] as manager of [facility].                                                                Patient/Legal Guardian Signature                                                    This signature was collected at 2023      self/Self                                                                            Printed Name/Relationship to Patient                                                [facility] complies with applicable Federal civil rights laws and does not discriminate on the basis of race, color, national origin, age, disability, or sex.          ATENCIÓN: si dasha corral, tiene a gill disposición servicios gratuitos de asistencia lingüística. Abimael vasquez [phone].         CHÚ Ý: N?u b?n nói Ti?ng Vi?t, có các d?ch v? h? tr? ngôn ng? mi?n phí dành cho b?n. G?i s? [phone].              REGISTRATION  AUTHORIZATION             Patient Name: Sally Gordon  : 1965  Patient Phone #: [phone]

## 2024-06-26 ENCOUNTER — PATIENT OUTREACH (OUTPATIENT)
Dept: ADMINISTRATIVE | Facility: HOSPITAL | Age: 59
End: 2024-06-26
Payer: COMMERCIAL

## 2024-06-26 NOTE — PROGRESS NOTES
Population Health Chart Review & Patient Outreach Details      Additional [facility] Health Notes:               Updates Requested / Reviewed:      Updated Care Coordination Note and          Health Maintenance Topics Overdue:      VB Score: 1     Mammogram                       Health Maintenance Topic(s) Outreach Outcomes & Actions Taken:    Breast Cancer Screening - Outreach Outcomes & Actions Taken  : External Records Uploaded & Care Team Updated if Applicable

## 2024-08-08 ENCOUNTER — OFFICE VISIT (OUTPATIENT)
Dept: FAMILY MEDICINE | Facility: CLINIC | Age: 59
End: 2024-08-08
Payer: COMMERCIAL

## 2024-08-08 ENCOUNTER — LAB VISIT (OUTPATIENT)
Dept: LAB | Facility: CLINIC | Age: 59
End: 2024-08-08
Payer: COMMERCIAL

## 2024-08-08 VITALS
HEIGHT: 62 IN | OXYGEN SATURATION: 97 % | BODY MASS INDEX: 25 KG/M2 | HEART RATE: 90 BPM | SYSTOLIC BLOOD PRESSURE: 128 MMHG | WEIGHT: 135.88 LBS | DIASTOLIC BLOOD PRESSURE: 84 MMHG

## 2024-08-08 DIAGNOSIS — D50.0 IRON DEFICIENCY ANEMIA DUE TO CHRONIC BLOOD LOSS: ICD-10-CM

## 2024-08-08 DIAGNOSIS — Z00.00 ANNUAL PHYSICAL EXAM: ICD-10-CM

## 2024-08-08 DIAGNOSIS — E78.00 PURE HYPERCHOLESTEROLEMIA, UNSPECIFIED: ICD-10-CM

## 2024-08-08 DIAGNOSIS — F41.8 MIXED ANXIETY DEPRESSIVE DISORDER: Primary | ICD-10-CM

## 2024-08-08 LAB
25(OH)D3+25(OH)D2 SERPL-MCNC: 11 NG/ML (ref 30–96)
ALBUMIN SERPL BCP-MCNC: 4 G/DL (ref 3.5–5.2)
ALP SERPL-CCNC: 79 U/L (ref 55–135)
ALT SERPL W/O P-5'-P-CCNC: 14 U/L (ref 10–44)
ANION GAP SERPL CALC-SCNC: 6 MMOL/L (ref 8–16)
AST SERPL-CCNC: 18 U/L (ref 10–40)
BASOPHILS # BLD AUTO: 0.07 K/UL (ref 0–0.2)
BASOPHILS NFR BLD: 1.2 % (ref 0–1.9)
BILIRUB SERPL-MCNC: 0.6 MG/DL (ref 0.1–1)
BUN SERPL-MCNC: 18 MG/DL (ref 6–20)
CALCIUM SERPL-MCNC: 9.7 MG/DL (ref 8.7–10.5)
CHLORIDE SERPL-SCNC: 109 MMOL/L (ref 95–110)
CHOLEST SERPL-MCNC: 231 MG/DL (ref 120–199)
CHOLEST/HDLC SERPL: 2.6 {RATIO} (ref 2–5)
CO2 SERPL-SCNC: 25 MMOL/L (ref 23–29)
CREAT SERPL-MCNC: 0.8 MG/DL (ref 0.5–1.4)
DIFFERENTIAL METHOD BLD: ABNORMAL
EOSINOPHIL # BLD AUTO: 0 K/UL (ref 0–0.5)
EOSINOPHIL NFR BLD: 0.5 % (ref 0–8)
ERYTHROCYTE [DISTWIDTH] IN BLOOD BY AUTOMATED COUNT: 13.1 % (ref 11.5–14.5)
EST. GFR  (NO RACE VARIABLE): >60 ML/MIN/1.73 M^2
ESTIMATED AVG GLUCOSE: 111 MG/DL (ref 68–131)
GLUCOSE SERPL-MCNC: 97 MG/DL (ref 70–110)
HBA1C MFR BLD: 5.5 % (ref 4–5.6)
HCT VFR BLD AUTO: 40.4 % (ref 37–48.5)
HDLC SERPL-MCNC: 88 MG/DL (ref 40–75)
HDLC SERPL: 38.1 % (ref 20–50)
HGB BLD-MCNC: 12.7 G/DL (ref 12–16)
IMM GRANULOCYTES # BLD AUTO: 0.01 K/UL (ref 0–0.04)
IMM GRANULOCYTES NFR BLD AUTO: 0.2 % (ref 0–0.5)
IRON SERPL-MCNC: 116 UG/DL (ref 30–160)
LDLC SERPL CALC-MCNC: 125.8 MG/DL (ref 63–159)
LYMPHOCYTES # BLD AUTO: 1.6 K/UL (ref 1–4.8)
LYMPHOCYTES NFR BLD: 27.6 % (ref 18–48)
MCH RBC QN AUTO: 28.7 PG (ref 27–31)
MCHC RBC AUTO-ENTMCNC: 31.4 G/DL (ref 32–36)
MCV RBC AUTO: 91 FL (ref 82–98)
MONOCYTES # BLD AUTO: 0.4 K/UL (ref 0.3–1)
MONOCYTES NFR BLD: 6.1 % (ref 4–15)
NEUTROPHILS # BLD AUTO: 3.8 K/UL (ref 1.8–7.7)
NEUTROPHILS NFR BLD: 64.4 % (ref 38–73)
NONHDLC SERPL-MCNC: 143 MG/DL
NRBC BLD-RTO: 0 /100 WBC
PLATELET # BLD AUTO: 279 K/UL (ref 150–450)
PMV BLD AUTO: 9.9 FL (ref 9.2–12.9)
POTASSIUM SERPL-SCNC: 4.2 MMOL/L (ref 3.5–5.1)
PROT SERPL-MCNC: 7.8 G/DL (ref 6–8.4)
RBC # BLD AUTO: 4.42 M/UL (ref 4–5.4)
SATURATED IRON: 33 % (ref 20–50)
SODIUM SERPL-SCNC: 140 MMOL/L (ref 136–145)
TOTAL IRON BINDING CAPACITY: 348 UG/DL (ref 250–450)
TRANSFERRIN SERPL-MCNC: 235 MG/DL (ref 200–375)
TRIGL SERPL-MCNC: 86 MG/DL (ref 30–150)
TSH SERPL DL<=0.005 MIU/L-ACNC: 1.01 UIU/ML (ref 0.4–4)
WBC # BLD AUTO: 5.94 K/UL (ref 3.9–12.7)

## 2024-08-08 PROCEDURE — 36415 COLL VENOUS BLD VENIPUNCTURE: CPT | Mod: ,,, | Performed by: FAMILY MEDICINE

## 2024-08-08 PROCEDURE — 82306 VITAMIN D 25 HYDROXY: CPT | Performed by: FAMILY MEDICINE

## 2024-08-08 PROCEDURE — 83036 HEMOGLOBIN GLYCOSYLATED A1C: CPT | Performed by: FAMILY MEDICINE

## 2024-08-08 PROCEDURE — 84466 ASSAY OF TRANSFERRIN: CPT | Performed by: FAMILY MEDICINE

## 2024-08-08 PROCEDURE — 84443 ASSAY THYROID STIM HORMONE: CPT | Performed by: FAMILY MEDICINE

## 2024-08-08 PROCEDURE — 80053 COMPREHEN METABOLIC PANEL: CPT | Performed by: FAMILY MEDICINE

## 2024-08-08 PROCEDURE — 83540 ASSAY OF IRON: CPT | Performed by: FAMILY MEDICINE

## 2024-08-08 PROCEDURE — 85025 COMPLETE CBC W/AUTO DIFF WBC: CPT | Performed by: FAMILY MEDICINE

## 2024-08-08 PROCEDURE — 80061 LIPID PANEL: CPT | Performed by: FAMILY MEDICINE

## 2024-08-08 RX ORDER — FERROUS SULFATE 325(65) MG
325 TABLET, DELAYED RELEASE (ENTERIC COATED) ORAL
COMMUNITY

## 2024-08-09 ENCOUNTER — OFFICE VISIT (OUTPATIENT)
Dept: FAMILY MEDICINE | Facility: CLINIC | Age: 59
End: 2024-08-09
Payer: COMMERCIAL

## 2024-08-09 VITALS
HEART RATE: 80 BPM | DIASTOLIC BLOOD PRESSURE: 80 MMHG | SYSTOLIC BLOOD PRESSURE: 136 MMHG | WEIGHT: 135.13 LBS | HEIGHT: 62 IN | OXYGEN SATURATION: 98 % | BODY MASS INDEX: 24.87 KG/M2

## 2024-08-09 DIAGNOSIS — Z12.4 CERVICAL CANCER SCREENING: Primary | ICD-10-CM

## 2024-08-14 ENCOUNTER — TELEPHONE (OUTPATIENT)
Dept: FAMILY MEDICINE | Facility: CLINIC | Age: 59
End: 2024-08-14
Payer: COMMERCIAL

## 2024-08-14 NOTE — TELEPHONE ENCOUNTER
----- Message from Daria Car DO sent at 8/14/2024  8:42 AM CDT -----  Please contact the patient and let her know that her Pap smear has resulted.  No acute findings.  Awaiting HPV results to confirm when next cervical cancer screening is due.

## 2024-08-15 ENCOUNTER — TELEPHONE (OUTPATIENT)
Dept: FAMILY MEDICINE | Facility: CLINIC | Age: 59
End: 2024-08-15
Payer: COMMERCIAL

## 2024-08-15 NOTE — TELEPHONE ENCOUNTER
----- Message from Daria Car DO sent at 8/15/2024  8:05 AM CDT -----  Please contact the patient and let her know that her HPV was negative.  Repeat cervical cancer screening in 5 years.

## 2025-01-08 RX ORDER — ATORVASTATIN CALCIUM 10 MG/1
TABLET, FILM COATED ORAL
Refills: 0 | OUTPATIENT
Start: 2025-01-08

## 2025-01-08 RX ORDER — SERTRALINE HYDROCHLORIDE 100 MG/1
TABLET, FILM COATED ORAL
Refills: 0 | OUTPATIENT
Start: 2025-01-08

## 2025-01-08 NOTE — TELEPHONE ENCOUNTER
No care due was identified.  Health [facility] Embedded Care Due Messages. Reference number: [id number].   1/08/2025 9:35:31 AM CST

## 2025-01-09 NOTE — TELEPHONE ENCOUNTER
Refill Decision Note   Sally Gordon  is requesting a refill authorization.  Brief Assessment and Rationale for Refill:  Quick Discontinue     Medication Therapy Plan:    Pharmacy is requesting new scripts for the following medications without required information, (sig/ frequency/qty/etc)      Medication Reconciliation Completed: No     Comments: Pharmacies have been requesting medications for patients without required information, (sig, frequency, qty, etc.). In addition, requests are sent for medication(s) pt. are currently not taking, and medications patients have never taken.    We have spoken to the pharmacies about these request types and advised their teams previously that we are unable to assess these New Script requests and require all details for these requests. This is a known issue and has been reported.     Note composed:9:44 PM 01/08/2025

## 2025-01-10 RX ORDER — SERTRALINE HYDROCHLORIDE 100 MG/1
100 TABLET, FILM COATED ORAL DAILY
Qty: 90 TABLET | Refills: 3 | Status: SHIPPED | OUTPATIENT
Start: 2025-01-10

## 2025-01-10 RX ORDER — ATORVASTATIN CALCIUM 10 MG/1
10 TABLET, FILM COATED ORAL DAILY
Qty: 90 TABLET | Refills: 3 | Status: SHIPPED | OUTPATIENT
Start: 2025-01-10

## 2025-01-10 NOTE — TELEPHONE ENCOUNTER
No care due was identified.  Health [facility] Embedded Care Due Messages. Reference number: [id number].   1/10/2025 10:39:23 AM CST

## 2025-01-10 NOTE — TELEPHONE ENCOUNTER
----- Message from [employer] sent at 1/10/2025  9:47 AM CST -----  Type:  RX Refill Request    Who Called:  pt  Refill or New Rx:  refill  RX Name and Strength:  :  atorvastatin (LIPITOR) 10 MG tablet  sertraline (ZOLOFT) 100 MG tablet  How is the patient currently taking it? (ex. 1XDay):  as rx'd  Is this a 30 day or 90 day RX:  90  Preferred Pharmacy with phone number:        [facility] [address] [address]  Phone: [phone] Fax: [phone]      Local or Mail Order:  mail  Ordering Provider:  same  Best Call Back Number:  [phone]   Additional Information:  Thanks

## 2025-02-10 ENCOUNTER — OFFICE VISIT (OUTPATIENT)
Dept: FAMILY MEDICINE | Facility: CLINIC | Age: 60
End: 2025-02-10
Payer: COMMERCIAL

## 2025-02-10 VITALS
HEIGHT: 62 IN | WEIGHT: 144.69 LBS | SYSTOLIC BLOOD PRESSURE: 112 MMHG | BODY MASS INDEX: 26.63 KG/M2 | DIASTOLIC BLOOD PRESSURE: 80 MMHG | HEART RATE: 100 BPM | OXYGEN SATURATION: 96 %

## 2025-02-10 DIAGNOSIS — E78.00 PURE HYPERCHOLESTEROLEMIA, UNSPECIFIED: ICD-10-CM

## 2025-02-10 DIAGNOSIS — F41.8 MIXED ANXIETY DEPRESSIVE DISORDER: Primary | ICD-10-CM

## 2025-02-10 DIAGNOSIS — E55.9 VITAMIN D DEFICIENCY: ICD-10-CM

## 2025-02-10 PROCEDURE — 3008F BODY MASS INDEX DOCD: CPT | Mod: CPTII,S$GLB,, | Performed by: FAMILY MEDICINE

## 2025-02-10 PROCEDURE — 99214 OFFICE O/P EST MOD 30 MIN: CPT | Mod: S$GLB,,, | Performed by: FAMILY MEDICINE

## 2025-02-10 PROCEDURE — 3079F DIAST BP 80-89 MM HG: CPT | Mod: CPTII,S$GLB,, | Performed by: FAMILY MEDICINE

## 2025-02-10 PROCEDURE — G2211 COMPLEX E/M VISIT ADD ON: HCPCS | Mod: S$GLB,,, | Performed by: FAMILY MEDICINE

## 2025-02-10 PROCEDURE — 3074F SYST BP LT 130 MM HG: CPT | Mod: CPTII,S$GLB,, | Performed by: FAMILY MEDICINE

## 2025-02-10 PROCEDURE — 1159F MED LIST DOCD IN RCRD: CPT | Mod: CPTII,S$GLB,, | Performed by: FAMILY MEDICINE

## 2025-02-10 RX ORDER — SERTRALINE HYDROCHLORIDE 100 MG/1
100 TABLET, FILM COATED ORAL DAILY
Qty: 90 TABLET | Refills: 3 | Status: SHIPPED | OUTPATIENT
Start: 2025-02-10

## 2025-02-10 RX ORDER — ATORVASTATIN CALCIUM 10 MG/1
10 TABLET, FILM COATED ORAL DAILY
Qty: 90 TABLET | Refills: 3 | Status: SHIPPED | OUTPATIENT
Start: 2025-02-10

## 2025-02-10 RX ORDER — VIT C/E/ZN/COPPR/LUTEIN/ZEAXAN 250MG-90MG
5000 CAPSULE ORAL DAILY
Qty: 90 CAPSULE | Refills: 3 | Status: SHIPPED | OUTPATIENT
Start: 2025-02-10 | End: 2026-02-10

## 2025-02-10 NOTE — PROGRESS NOTES
"    [facility] [place], [place]    Family Medicine Office Visit    Chief Complaint   Patient presents with    Follow-up     6 month follow up         HPI: follow up chronic conditions    KENYETTA largely resolved  Vit D deficiency notable last labs  Hyperlipidemia stable on appropriate intensity statin therapy  Anxiety stable somewhat    ROS: as above    Vitals:    02/10/25 0845   BP: 112/80   BP Location: Left arm   Patient Position: Sitting   Pulse: 100   SpO2: 96%   Weight: 65.6 kg (144 lb 11.2 oz)   Height: 5' 2" (1.575 m)      Body mass index is 26.47 kg/m².      General:  AOx3, well nourished and developed in no acute distress  Eyes:  PERRLA, EOMI, vision intact grossly  ENT:  normal hearing, moist oral mucosa  Neck:  trachea midline with no masses or thyromegaly  Heart:  RRR, no murmurs.  No edema noted, extremities warm and well perfused  Lungs:  clear to auscultation bilaterally with symmetric chest movement  Abdomen:  Soft, nontender, nondistended.  Normal bowel sounds  Musculoskeletal:  Normal gait.  Normal posture.  Normal muscular development with no joint swelling.  Neurological:  CN II-XII grossly intact. Symmetric strength and sensation  Psych:  Normal mood and affect.  Able to demonstrate good judgement and personal insight.      Assessment/Plan:    1. Mixed anxiety depressive disorder    2. Pure hypercholesterolemia, unspecified    3. Vitamin D deficiency    Other orders  -     atorvastatin (LIPITOR) 10 MG tablet; Take 1 tablet (10 mg total) by mouth once daily.  Dispense: 90 tablet; Refill: 3  -     sertraline (ZOLOFT) 100 MG tablet; Take 1 tablet (100 mg total) by mouth once daily.  Dispense: 90 tablet; Refill: 3  -     cholecalciferol, vitamin D3, 125 mcg (5,000 unit) capsule; Take 1 capsule (5,000 Units total) by mouth once daily.  Dispense: 90 capsule; Refill: 3         Stable, continue medication  Stable on statin, good labs last check  Start supplementation    Visit " today included increased complexity associated with the care of the episodic problem hld, anxiety, vit D addressed and managing the longitudinal care of the patient due to the serious and/or complex managed problem(s) hld, anxiety, vit D.

## 2025-07-25 ENCOUNTER — TELEPHONE (OUTPATIENT)
Dept: FAMILY MEDICINE | Facility: CLINIC | Age: 60
End: 2025-07-25
Payer: COMMERCIAL

## 2025-08-11 ENCOUNTER — OFFICE VISIT (OUTPATIENT)
Dept: FAMILY MEDICINE | Facility: CLINIC | Age: 60
End: 2025-08-11
Payer: COMMERCIAL

## 2025-08-11 VITALS
WEIGHT: 160.06 LBS | BODY MASS INDEX: 29.45 KG/M2 | DIASTOLIC BLOOD PRESSURE: 78 MMHG | HEART RATE: 90 BPM | HEIGHT: 62 IN | OXYGEN SATURATION: 98 % | SYSTOLIC BLOOD PRESSURE: 110 MMHG

## 2025-08-11 DIAGNOSIS — D50.0 IRON DEFICIENCY ANEMIA DUE TO CHRONIC BLOOD LOSS: ICD-10-CM

## 2025-08-11 DIAGNOSIS — E78.00 PURE HYPERCHOLESTEROLEMIA, UNSPECIFIED: Primary | ICD-10-CM

## 2025-08-11 DIAGNOSIS — Z12.31 SCREENING MAMMOGRAM FOR BREAST CANCER: ICD-10-CM

## 2025-08-11 DIAGNOSIS — E66.3 OVERWEIGHT (BMI 25.0-29.9): ICD-10-CM

## 2025-08-11 DIAGNOSIS — F41.8 MIXED ANXIETY DEPRESSIVE DISORDER: ICD-10-CM

## 2025-08-11 DIAGNOSIS — E78.2 MIXED HYPERLIPIDEMIA: ICD-10-CM

## 2025-08-11 PROCEDURE — 84443 ASSAY THYROID STIM HORMONE: CPT | Performed by: FAMILY MEDICINE

## 2025-08-11 PROCEDURE — 82040 ASSAY OF SERUM ALBUMIN: CPT | Performed by: FAMILY MEDICINE

## 2025-08-11 PROCEDURE — 85025 COMPLETE CBC W/AUTO DIFF WBC: CPT | Performed by: FAMILY MEDICINE

## 2025-08-11 PROCEDURE — 82306 VITAMIN D 25 HYDROXY: CPT | Performed by: FAMILY MEDICINE

## 2025-08-11 PROCEDURE — 80061 LIPID PANEL: CPT | Performed by: FAMILY MEDICINE

## 2025-08-11 PROCEDURE — 83036 HEMOGLOBIN GLYCOSYLATED A1C: CPT | Performed by: FAMILY MEDICINE

## 2025-08-11 PROCEDURE — 84466 ASSAY OF TRANSFERRIN: CPT | Performed by: FAMILY MEDICINE

## 2025-08-28 LAB — BCS RECOMMENDATION EXT: NORMAL

## 2025-09-04 ENCOUNTER — PATIENT OUTREACH (OUTPATIENT)
Dept: ADMINISTRATIVE | Facility: HOSPITAL | Age: 60
End: 2025-09-04
Payer: COMMERCIAL